# Patient Record
Sex: MALE | Race: WHITE | NOT HISPANIC OR LATINO
[De-identification: names, ages, dates, MRNs, and addresses within clinical notes are randomized per-mention and may not be internally consistent; named-entity substitution may affect disease eponyms.]

---

## 2017-04-09 ENCOUNTER — TRANSCRIPTION ENCOUNTER (OUTPATIENT)
Age: 22
End: 2017-04-09

## 2017-11-17 ENCOUNTER — NON-APPOINTMENT (OUTPATIENT)
Age: 22
End: 2017-11-17

## 2017-11-17 ENCOUNTER — APPOINTMENT (OUTPATIENT)
Dept: CARDIOLOGY | Facility: CLINIC | Age: 22
End: 2017-11-17
Payer: COMMERCIAL

## 2017-11-17 VITALS
DIASTOLIC BLOOD PRESSURE: 67 MMHG | WEIGHT: 207 LBS | HEIGHT: 68 IN | BODY MASS INDEX: 31.37 KG/M2 | OXYGEN SATURATION: 99 % | SYSTOLIC BLOOD PRESSURE: 110 MMHG | HEART RATE: 68 BPM

## 2017-11-17 DIAGNOSIS — Z82.49 FAMILY HISTORY OF ISCHEMIC HEART DISEASE AND OTHER DISEASES OF THE CIRCULATORY SYSTEM: ICD-10-CM

## 2017-11-17 DIAGNOSIS — F90.9 ATTENTION-DEFICIT HYPERACTIVITY DISORDER, UNSPECIFIED TYPE: ICD-10-CM

## 2017-11-17 PROCEDURE — 99244 OFF/OP CNSLTJ NEW/EST MOD 40: CPT

## 2017-11-17 PROCEDURE — 93000 ELECTROCARDIOGRAM COMPLETE: CPT

## 2018-01-22 ENCOUNTER — TRANSCRIPTION ENCOUNTER (OUTPATIENT)
Age: 23
End: 2018-01-22

## 2018-03-13 ENCOUNTER — EMERGENCY (EMERGENCY)
Facility: HOSPITAL | Age: 23
LOS: 1 days | Discharge: HOME | End: 2018-03-13
Attending: EMERGENCY MEDICINE | Admitting: EMERGENCY MEDICINE
Payer: COMMERCIAL

## 2018-03-13 VITALS
WEIGHT: 188.05 LBS | SYSTOLIC BLOOD PRESSURE: 143 MMHG | HEIGHT: 70 IN | DIASTOLIC BLOOD PRESSURE: 76 MMHG | HEART RATE: 86 BPM | RESPIRATION RATE: 18 BRPM | TEMPERATURE: 98 F | OXYGEN SATURATION: 97 %

## 2018-03-13 LAB
ALBUMIN SERPL ELPH-MCNC: 4.3 G/DL — SIGNIFICANT CHANGE UP (ref 3.3–5)
ALP SERPL-CCNC: 65 U/L — SIGNIFICANT CHANGE UP (ref 30–120)
ALT FLD-CCNC: 22 U/L DA — SIGNIFICANT CHANGE UP (ref 10–60)
ANION GAP SERPL CALC-SCNC: 14 MMOL/L — SIGNIFICANT CHANGE UP (ref 5–17)
APAP SERPL-MCNC: <1 — SIGNIFICANT CHANGE UP (ref 10–30)
APPEARANCE UR: CLEAR — SIGNIFICANT CHANGE UP
AST SERPL-CCNC: 19 U/L — SIGNIFICANT CHANGE UP (ref 10–40)
BASOPHILS # BLD AUTO: 0 K/UL — SIGNIFICANT CHANGE UP (ref 0–0.2)
BASOPHILS NFR BLD AUTO: 0.6 % — SIGNIFICANT CHANGE UP (ref 0–2)
BILIRUB SERPL-MCNC: 0.3 MG/DL — SIGNIFICANT CHANGE UP (ref 0.2–1.2)
BILIRUB UR-MCNC: NEGATIVE — SIGNIFICANT CHANGE UP
BUN SERPL-MCNC: 12 MG/DL — SIGNIFICANT CHANGE UP (ref 7–23)
CALCIUM SERPL-MCNC: 9.1 MG/DL — SIGNIFICANT CHANGE UP (ref 8.4–10.5)
CHLORIDE SERPL-SCNC: 105 MMOL/L — SIGNIFICANT CHANGE UP (ref 96–108)
CO2 SERPL-SCNC: 22 MMOL/L — SIGNIFICANT CHANGE UP (ref 22–31)
COLOR SPEC: YELLOW — SIGNIFICANT CHANGE UP
CREAT SERPL-MCNC: 0.8 MG/DL — SIGNIFICANT CHANGE UP (ref 0.5–1.3)
DIFF PNL FLD: NEGATIVE — SIGNIFICANT CHANGE UP
EOSINOPHIL # BLD AUTO: 0 K/UL — SIGNIFICANT CHANGE UP (ref 0–0.5)
EOSINOPHIL NFR BLD AUTO: 0.2 % — SIGNIFICANT CHANGE UP (ref 0–6)
ETHANOL SERPL-MCNC: 213 MG/DL — HIGH (ref 0–3)
GLUCOSE SERPL-MCNC: 105 MG/DL — HIGH (ref 70–99)
GLUCOSE UR QL: NEGATIVE MG/DL — SIGNIFICANT CHANGE UP
HCT VFR BLD CALC: 43.4 % — SIGNIFICANT CHANGE UP (ref 39–50)
HGB BLD-MCNC: 15 G/DL — SIGNIFICANT CHANGE UP (ref 13–17)
KETONES UR-MCNC: NEGATIVE — SIGNIFICANT CHANGE UP
LEUKOCYTE ESTERASE UR-ACNC: NEGATIVE — SIGNIFICANT CHANGE UP
LYMPHOCYTES # BLD AUTO: 3.4 K/UL — HIGH (ref 1–3.3)
LYMPHOCYTES # BLD AUTO: 46.5 % — HIGH (ref 13–44)
MCHC RBC-ENTMCNC: 30.6 PG — SIGNIFICANT CHANGE UP (ref 27–34)
MCHC RBC-ENTMCNC: 34.6 GM/DL — SIGNIFICANT CHANGE UP (ref 32–36)
MCV RBC AUTO: 88.4 FL — SIGNIFICANT CHANGE UP (ref 80–100)
MONOCYTES # BLD AUTO: 0.6 K/UL — SIGNIFICANT CHANGE UP (ref 0–0.9)
MONOCYTES NFR BLD AUTO: 7.7 % — SIGNIFICANT CHANGE UP (ref 2–14)
NEUTROPHILS # BLD AUTO: 3.3 K/UL — SIGNIFICANT CHANGE UP (ref 1.8–7.4)
NEUTROPHILS NFR BLD AUTO: 44.9 % — SIGNIFICANT CHANGE UP (ref 43–77)
NITRITE UR-MCNC: NEGATIVE — SIGNIFICANT CHANGE UP
PCP SPEC-MCNC: SIGNIFICANT CHANGE UP
PH UR: 6 — SIGNIFICANT CHANGE UP (ref 5–8)
PLATELET # BLD AUTO: 261 K/UL — SIGNIFICANT CHANGE UP (ref 150–400)
POTASSIUM SERPL-MCNC: 3.7 MMOL/L — SIGNIFICANT CHANGE UP (ref 3.5–5.3)
POTASSIUM SERPL-SCNC: 3.7 MMOL/L — SIGNIFICANT CHANGE UP (ref 3.5–5.3)
PROT SERPL-MCNC: 8 G/DL — SIGNIFICANT CHANGE UP (ref 6–8.3)
PROT UR-MCNC: NEGATIVE MG/DL — SIGNIFICANT CHANGE UP
RBC # BLD: 4.91 M/UL — SIGNIFICANT CHANGE UP (ref 4.2–5.8)
RBC # FLD: 12.1 % — SIGNIFICANT CHANGE UP (ref 10.3–14.5)
SALICYLATES SERPL-MCNC: 5 MG/DL — SIGNIFICANT CHANGE UP (ref 2.8–20)
SODIUM SERPL-SCNC: 141 MMOL/L — SIGNIFICANT CHANGE UP (ref 135–145)
SP GR SPEC: 1.01 — SIGNIFICANT CHANGE UP (ref 1.01–1.02)
UROBILINOGEN FLD QL: NEGATIVE MG/DL — SIGNIFICANT CHANGE UP
WBC # BLD: 7.4 K/UL — SIGNIFICANT CHANGE UP (ref 3.8–10.5)
WBC # FLD AUTO: 7.4 K/UL — SIGNIFICANT CHANGE UP (ref 3.8–10.5)

## 2018-03-13 PROCEDURE — 99285 EMERGENCY DEPT VISIT HI MDM: CPT

## 2018-03-13 RX ORDER — DIPHENHYDRAMINE HCL 50 MG
50 CAPSULE ORAL ONCE
Qty: 0 | Refills: 0 | Status: COMPLETED | OUTPATIENT
Start: 2018-03-13 | End: 2018-03-13

## 2018-03-13 RX ADMIN — Medication 50 MILLIGRAM(S): at 23:31

## 2018-03-13 RX ADMIN — Medication 2 MILLIGRAM(S): at 23:31
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
## 2018-03-13 NOTE — ED ADULT NURSE NOTE - OBJECTIVE STATEMENT
pt states he doesn't know why he is here, states he take Adderall and may be that is causing problems. pt BIB stepfather for acting out behavior at home. +smell of alcohol on breath. pt denies SI or HI.

## 2018-03-13 NOTE — ED PROVIDER NOTE - NS_ ATTENDINGSCRIBEDETAILS _ED_A_ED_FT
Anthony Jacome MD - The scribe's documentation has been prepared under my direction and personally reviewed by me in its entirety. I confirm that the note above accurately reflects all work, treatment, procedures, and medical decision making performed by me.

## 2018-03-13 NOTE — ED PROVIDER NOTE - PROGRESS NOTE DETAILS
Pt's step-father states pt admitted to drinking alcohol tonight. Patient became agitated, confrontational, making threatening physical contact with staff.  Sedation ordered

## 2018-03-14 VITALS
DIASTOLIC BLOOD PRESSURE: 86 MMHG | OXYGEN SATURATION: 97 % | RESPIRATION RATE: 18 BRPM | TEMPERATURE: 98 F | SYSTOLIC BLOOD PRESSURE: 123 MMHG | HEART RATE: 84 BPM

## 2018-03-14 DIAGNOSIS — F19.94 OTHER PSYCHOACTIVE SUBSTANCE USE, UNSPECIFIED WITH PSYCHOACTIVE SUBSTANCE-INDUCED MOOD DISORDER: ICD-10-CM

## 2018-03-14 DIAGNOSIS — F98.8 OTHER SPECIFIED BEHAVIORAL AND EMOTIONAL DISORDERS WITH ONSET USUALLY OCCURRING IN CHILDHOOD AND ADOLESCENCE: ICD-10-CM

## 2018-03-14 LAB
AMPHET UR-MCNC: NEGATIVE — SIGNIFICANT CHANGE UP
BARBITURATES UR SCN-MCNC: NEGATIVE — SIGNIFICANT CHANGE UP
BENZODIAZ UR-MCNC: NEGATIVE — SIGNIFICANT CHANGE UP
COCAINE METAB.OTHER UR-MCNC: POSITIVE
ETHANOL SERPL-MCNC: 97 MG/DL — HIGH (ref 0–3)
METHADONE UR-MCNC: NEGATIVE — SIGNIFICANT CHANGE UP
OPIATES UR-MCNC: NEGATIVE — SIGNIFICANT CHANGE UP
PCP UR-MCNC: NEGATIVE — SIGNIFICANT CHANGE UP
THC UR QL: POSITIVE

## 2018-03-14 PROCEDURE — 99285 EMERGENCY DEPT VISIT HI MDM: CPT | Mod: 25

## 2018-03-14 PROCEDURE — 85027 COMPLETE CBC AUTOMATED: CPT

## 2018-03-14 PROCEDURE — 90792 PSYCH DIAG EVAL W/MED SRVCS: CPT | Mod: GT

## 2018-03-14 PROCEDURE — 81003 URINALYSIS AUTO W/O SCOPE: CPT

## 2018-03-14 PROCEDURE — 96372 THER/PROPH/DIAG INJ SC/IM: CPT

## 2018-03-14 PROCEDURE — 80053 COMPREHEN METABOLIC PANEL: CPT

## 2018-03-14 PROCEDURE — 80307 DRUG TEST PRSMV CHEM ANLYZR: CPT
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
## 2018-03-14 NOTE — ED BEHAVIORAL HEALTH ASSESSMENT NOTE - LEGAL HISTORY
Denies - As per dad, there was a document in pt's belongings indicating he was recently arrested for possession of marijuana but unclear details.

## 2018-03-14 NOTE — ED BEHAVIORAL HEALTH ASSESSMENT NOTE - SUMMARY
Patient is a 22 year old male, domiciled, senior at [employer], non-caregiver, with a self-reported PPH of ADD and Depression, denies prior hospitalizations, current outpatient treatment with psychiatrist Dr. Floyd and therapist Kavin Harris, denies hx of self harm behaviors, denies prior suicide attempts, denies manic or psychotic s/s, denies hx of violence or arrests, denies trauma, + ETOH, Marijuana and Cocaine use/abuse, with no relevant past medical history, brought in by EMS, from home, presenting with agitation/aggression and possible SI, in the context of intoxication.    Pt evaluated after sufficient time lapsed for sobriety. Pt has a difficult time recalling the events of last night as he blacked out in the context of alcohol, marijuana, prescribed Adderall and possibly cocaine use. At time of interview, pt is remorseful for his behavior but shows limited insight regarding the impact substances are having in his life. Pt denies s/s of depression, denies suicidal ideations, intent or plans. Pt is not manic or psychotic. He denies any aggressive or homicidal ideations, intent or plans. Dad confirms that when sober, pt does not exhibit any concerning symptoms, is not usually agitated or aggressive. Dad feels safe taking pt home now that he is sober. Pt willing to accept resources for substance use at this time although does not feel he has a problem. Pt to follow up with his current outpatient providers. Family and patient advised to return to ED or call 911 for any worsening symptoms or safety concerns.

## 2018-03-14 NOTE — ED BEHAVIORAL HEALTH ASSESSMENT NOTE - OTHER
15 [address] Vee Father Reports issues with focus at school but has normal attention span throughout interview Conflicting sexual identity issues Likely conflicting sexual identity issues Substance use Superficially cooperative but minimizing drug use and guarded about some topics Likely impaired while on substances n/a

## 2018-03-14 NOTE — ED BEHAVIORAL HEALTH ASSESSMENT NOTE - SUICIDE PROTECTIVE FACTORS
Responsibility to family and others/Identifies reasons for living/Future oriented/Supportive social network or family/Positive therapeutic relationships/Engaged in work or school

## 2018-03-14 NOTE — ED ADULT NURSE REASSESSMENT NOTE - NS ED NURSE REASSESS COMMENT FT1
pt agitated, got OOB, wants to go home . [facility] called.
pt sleeping, no distress noted, 1:1 observation in progress.
Tele psyche completed and cleared , pt reevaluated by MICHAEL MORRISON. pt denies any complaints. ambulated with steady gait, speech clear. AAOx3. pt D/C home with f/u instructions.

## 2018-03-14 NOTE — ED BEHAVIORAL HEALTH ASSESSMENT NOTE - DESCRIPTION (FIRST USE, LAST USE, QUANTITY, FREQUENCY, DURATION)
Reports drinking on weekends primarily although drank tonight because it was a snow day. BAL upon arrival was 213 mg/dL Reports use couple times per week Minimizing use, reports last use was during a party 1-2 weeks ago where he put some in his mouth as a "freeze"
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
## 2018-03-14 NOTE — ED BEHAVIORAL HEALTH NOTE - BEHAVIORAL HEALTH NOTE
ISTOP Reference #: [id number]     02/26/2018 03/01/2018 dextroamp-amphet er 15 mg cap  60 30 Medhat Floyd MD     02/23/2018 02/24/2018 dextroamp-amphet er 15 mg cap  14 7 Bacilio Floyd MD     01/22/2018 01/28/2018 dextroamp-amphet er 15 mg cap  60 30 Medhat Floyd MD     12/12/2017 12/13/2017 dextroamp-amphet er 15 mg cap  60 30 Medhat Floyd MD     11/22/2017 11/22/2017 dextroamp-amphet er 15 mg cap  30 30 Medhat Floyd MD

## 2018-03-14 NOTE — ED BEHAVIORAL HEALTH ASSESSMENT NOTE - DETAILS
Father Pt made a suicidal statement while intoxicated See HPI Paternal aunt (same one as below) - Dementia; Paternal grandfather - Dementia Mother - marijuana; Paternal aunt - prescription drug abuse and alcohol use d/o

## 2018-03-14 NOTE — ED BEHAVIORAL HEALTH ASSESSMENT NOTE - RISK ASSESSMENT
Risk factors: Single, male gender, long hx of depression, impulsivity, voiced suicidality when intoxicated, substance abuse, limited insight into behaviors, likely conflicting sexual identity issues.     Protective factors: Young, healthy, denies any active suicidal ideation/intent/plan, no hx of prior attempts, no self-harm behaviors, no hospitalizations, no family hx, has no acute affective or psychotic disorder/symptoms, has responsibility to family and others, identifies reasons for living, future oriented, supportive social network or family, engaged in school, positive therapeutic relationships, medication/follow up compliance, no access to firearms, no hx of abuse and adequate outpatient psychiatric follow up with motivation to participate in care.     Based on risk assessment evaluation, Pt does not appear to be at imminent risk of harm to self or others at this time.

## 2018-03-14 NOTE — ED BEHAVIORAL HEALTH ASSESSMENT NOTE - CASE SUMMARY
Patient is a 22 year old male, domiciled, senior at [employer], non-caregiver, with a self-reported PPH of ADD and Depression, denies prior hospitalizations, current outpatient treatment with psychiatrist Dr. Floyd and therapist Kavin Harris, denies hx of self harm behaviors, denies prior suicide attempts, denies manic or psychotic s/s, denies hx of violence or arrests, denies trauma, + ETOH, Marijuana and Cocaine use/abuse, with no relevant past medical history, brought in by EMS, from home, presenting with agitation/aggression and possible SI, in the context of intoxication.  pT. DOES not meet criteria for inpt. psych.  Discharge home with oupt. f/u.  Substance abuse material given.

## 2018-03-14 NOTE — ED BEHAVIORAL HEALTH ASSESSMENT NOTE - DESCRIPTION
As per EM/[place] documentation, pt was agitated and aggressive upon arrival, demanding to leave, required IM medications. At time of  evaluation, pt calm and cooperative. denies Pt lives with his father, step-father (father's ) and his four siblings. He is attending [employer], is about to graduate with his degree in Business Management, is not able to answer regarding his sexual orientation at this time.

## 2018-04-30 ENCOUNTER — TRANSCRIPTION ENCOUNTER (OUTPATIENT)
Age: 23
End: 2018-04-30

## 2018-08-11 ENCOUNTER — TRANSCRIPTION ENCOUNTER (OUTPATIENT)
Age: 23
End: 2018-08-11

## 2019-01-03 ENCOUNTER — TRANSCRIPTION ENCOUNTER (OUTPATIENT)
Age: 24
End: 2019-01-03

## 2021-04-22 ENCOUNTER — APPOINTMENT (OUTPATIENT)
Dept: CARDIOLOGY | Facility: CLINIC | Age: 26
End: 2021-04-22

## 2021-04-27 ENCOUNTER — EMERGENCY (EMERGENCY)
Facility: HOSPITAL | Age: 26
LOS: 1 days | Discharge: HOME | End: 2021-04-27
Attending: EMERGENCY MEDICINE | Admitting: EMERGENCY MEDICINE
Payer: COMMERCIAL

## 2021-04-27 VITALS
DIASTOLIC BLOOD PRESSURE: 74 MMHG | RESPIRATION RATE: 20 BRPM | WEIGHT: 255.96 LBS | HEART RATE: 85 BPM | OXYGEN SATURATION: 98 % | SYSTOLIC BLOOD PRESSURE: 124 MMHG | TEMPERATURE: 98 F | HEIGHT: 70 IN

## 2021-04-27 VITALS
TEMPERATURE: 98 F | OXYGEN SATURATION: 97 % | SYSTOLIC BLOOD PRESSURE: 122 MMHG | DIASTOLIC BLOOD PRESSURE: 71 MMHG | HEART RATE: 82 BPM | RESPIRATION RATE: 18 BRPM

## 2021-04-27 PROCEDURE — 99284 EMERGENCY DEPT VISIT MOD MDM: CPT

## 2021-04-27 PROCEDURE — 99283 EMERGENCY DEPT VISIT LOW MDM: CPT

## 2021-04-27 RX ADMIN — Medication 40 MILLIGRAM(S): at 21:19

## 2021-04-27 NOTE — ED PROVIDER NOTE - CLINICAL SUMMARY MEDICAL DECISION MAKING FREE TEXT BOX
26yo male with paresthesias to upper and lower extremity, most likely radiculopathy, po steroids, neuro outpt follow up

## 2021-04-27 NOTE — ED PROVIDER NOTE - OBJECTIVE STATEMENT
26yo male who presents with pins and needles to the arms and legs for 2 weeks. pt states he was boxing and punched a bag and felt a pop in his upper back, and since then has been having pins and needles to both hands, now radiating to arms, and feet, radiating up to ankles, no weakness, no neck or back pain, no dizziness pt has been taking motrin with some relief, pt also has appt monday with a neurologist for further work up

## 2021-04-27 NOTE — ED ADULT NURSE NOTE - OBJECTIVE STATEMENT
Patient presents states he was boxing 2 weeks ago and he felt a snap in his upper back. since then he has noted tingling progressively to his hands, arms, shoulders and feet. Patient has a scheduled appointment with a neurologist in the next week. No weakness.

## 2021-04-27 NOTE — ED PROVIDER NOTE - NSFOLLOWUPINSTRUCTIONS_ED_ALL_ED_FT
Cervical Radiculopathy    WHAT YOU NEED TO KNOW:    Cervical radiculopathy is a painful condition that happens when a spinal nerve in your neck is pinched or irritated.     Vertebral Column         DISCHARGE INSTRUCTIONS:    Medicines: You may need any of the following:  •NSAIDs help decrease swelling and pain. This medicine can be bought without a doctor's order. This medicine can cause stomach bleeding or kidney problems in certain people. If you take blood thinner medicine, always ask your healthcare provider if NSAIDs are safe for you. Always read the medicine label and follow the directions on it before using this medicine.      •Prescription pain medicine helps decrease pain. Do not wait until the pain is severe before you take this medicine.      •Steroids help decrease pain and swelling. These may be given as a pill or as an injection in your neck. You may need more than 1 injection if your symptoms do not improve after the first treatment.       •Take your medicine as directed. Contact your healthcare provider if you think your medicine is not helping or if you have side effects. Tell him of her if you are allergic to any medicine. Keep a list of the medicines, vitamins, and herbs you take. Include the amounts, and when and why you take them. Bring the list or the pill bottles to follow-up visits. Carry your medicine list with you in case of an emergency.      Follow up with your healthcare provider or spine specialist as directed: Write down your questions so you remember to ask them during your visits.     Physical therapy: Your healthcare provider may suggest physical therapy to stretch and strengthen your muscles. Your physical therapist can teach you how to improve your posture and the way you hold your neck. He may also teach you how to be safely active and avoid further injury. He can also help you develop an exercise program that is safe for your back and neck.     Self-care:   •Ice helps decrease swelling and pain. Ice may also help prevent tissue damage. Use an ice pack, or put crushed ice in a plastic bag. Cover it with a towel and place it on your neck for 15 to 20 minutes every hour or as directed.      •Rest when you feel it is needed. Slowly start to do more each day. Return to your daily activities as directed.       •Wear a soft collar. You may be given a soft collar to support your neck while you sleep. Wear the soft collar only as directed.  Cervical Collars           •Do light stretches and regular exercise. Your healthcare provider may suggest light stretches to help decrease stiffness in your neck and arm as you recover. After your pain is controlled, you may benefit from regular exercise. Ask what type of exercise is safe for your back and neck.       •Review your work area. A comfortable work area can help prevent neck strain. Ask your employer for an ergonomic review to check the position of your desk, chair, phone, and computer. Make any necessary adjustments for your comfort.       Contact your healthcare provider or spine specialist if:   •You have a fever.      •You are losing weight without trying.      •Your pain is worse, even with medicine.      •One or both hands feel more numb than before, or you cannot move your fingers well.      •You have questions or concerns about your condition or care.

## 2021-04-27 NOTE — ED ADULT NURSE NOTE - CHPI ED NUR SYMPTOMS NEG
no anorexia/no bladder dysfunction/no bowel dysfunction/no constipation/no difficulty bearing weight/no fatigue/no motor function loss/no neck tenderness

## 2021-04-27 NOTE — ED PROVIDER NOTE - PATIENT PORTAL LINK FT
You can access the FollowMyHealth Patient Portal offered by [facility] by registering at the following website: [email or web address]. By joining [facility]’s FollowMyHealth portal, you will also be able to view your health information using other applications (apps) compatible with our system.

## 2021-04-27 NOTE — ED ADULT TRIAGE NOTE - CCCP TRG CHIEF CMPLNT
BILATERAL DIGITAL SCREENING MAMMOGRAM TOMOSYNTHESIS WITH CAD: 7/31/2018

CLINICAL HISTORY: Routine screening.  





TECHNIQUE: The study was acquired using full field digital technology and interpreted from soft copy.
 Breast tomosynthesis in addition to standard 2D mammography was performed. Current study was also ev
aluated with a Computer Aided Detection (CAD) system.  



COMPARISON: Comparison is made to exams dated:  7/26/2017 mammogram, 7/25/2016 mammogram, 12/1/2015 m
ammogram, 7/22/2015 mammogram, 7/18/2014 mammogram, and 7/10/2013 mammogram - [facility]
enter.   

BREAST COMPOSITION: There are scattered areas of fibroglandular density in both breasts.  



FINDINGS: 

The parenchymal pattern is unchanged. No developing mass, architectural distortion or cluster of susp
icious microcalcifications is seen in either breast.  





IMPRESSION: ACR BI-RADS CATEGORY 2: BENIGN

There is no mammographic evidence of malignancy. A 1 year screening mammogram is recommended.(08/01/2
019)  The patient will receive written notification of the results.  





Some breast cancers are not detected with mammography. A negative mammographic report should not ghada
y biopsy if a clinically suggestive mass is present.



Odessa Baeza M.D.          

ay/:7/31/2018 15:51:49  



Imaging Technologist: RT Seth([id number])(M), [facility]

letter sent: Normal 1/2  

BI-RADS Code: ACR BI-RADS Category 2: Benign numbness

## 2021-06-21 NOTE — ED ADULT NURSE NOTE - NS ED PATIENT SAFETY CONCERN
Airway patent, TM normal bilaterally, normal appearing mouth, nose, throat, neck supple with full range of motion, no cervical adenopathy. No

## 2021-06-30 PROBLEM — F41.9 ANXIETY DISORDER, UNSPECIFIED: Chronic | Status: ACTIVE | Noted: 2021-04-27

## 2021-07-06 DIAGNOSIS — F12.90 CANNABIS USE, UNSPECIFIED, UNCOMPLICATED: ICD-10-CM

## 2021-07-06 DIAGNOSIS — Z78.9 OTHER SPECIFIED HEALTH STATUS: ICD-10-CM

## 2021-07-06 RX ORDER — ESCITALOPRAM OXALATE 20 MG/1
20 TABLET, FILM COATED ORAL
Refills: 0 | Status: ACTIVE | COMMUNITY

## 2021-07-07 ENCOUNTER — APPOINTMENT (OUTPATIENT)
Dept: CARDIOLOGY | Facility: CLINIC | Age: 26
End: 2021-07-07
Payer: COMMERCIAL

## 2021-07-07 ENCOUNTER — NON-APPOINTMENT (OUTPATIENT)
Age: 26
End: 2021-07-07

## 2021-07-07 VITALS
OXYGEN SATURATION: 98 % | WEIGHT: 214 LBS | SYSTOLIC BLOOD PRESSURE: 112 MMHG | HEIGHT: 68 IN | HEART RATE: 70 BPM | BODY MASS INDEX: 32.43 KG/M2 | DIASTOLIC BLOOD PRESSURE: 72 MMHG

## 2021-07-07 DIAGNOSIS — Z71.89 OTHER SPECIFIED COUNSELING: ICD-10-CM

## 2021-07-07 PROCEDURE — 93000 ELECTROCARDIOGRAM COMPLETE: CPT

## 2021-07-07 PROCEDURE — 99203 OFFICE O/P NEW LOW 30 MIN: CPT

## 2021-07-07 PROCEDURE — 99072 ADDL SUPL MATRL&STAF TM PHE: CPT

## 2023-02-23 ENCOUNTER — EMERGENCY (EMERGENCY)
Facility: HOSPITAL | Age: 28
LOS: 1 days | Discharge: HOME | End: 2023-02-23
Attending: EMERGENCY MEDICINE | Admitting: EMERGENCY MEDICINE
Payer: COMMERCIAL

## 2023-02-23 VITALS
SYSTOLIC BLOOD PRESSURE: 124 MMHG | TEMPERATURE: 100 F | DIASTOLIC BLOOD PRESSURE: 82 MMHG | HEART RATE: 90 BPM | RESPIRATION RATE: 16 BRPM | OXYGEN SATURATION: 98 %

## 2023-02-23 VITALS
RESPIRATION RATE: 20 BRPM | WEIGHT: 238.1 LBS | HEIGHT: 70 IN | OXYGEN SATURATION: 97 % | SYSTOLIC BLOOD PRESSURE: 147 MMHG | HEART RATE: 113 BPM | DIASTOLIC BLOOD PRESSURE: 93 MMHG | TEMPERATURE: 102 F

## 2023-02-23 LAB
ALBUMIN SERPL ELPH-MCNC: 4.1 G/DL — SIGNIFICANT CHANGE UP (ref 3.3–5)
ALP SERPL-CCNC: 71 U/L — SIGNIFICANT CHANGE UP (ref 30–120)
ALT FLD-CCNC: 25 U/L DA — SIGNIFICANT CHANGE UP (ref 10–60)
ANION GAP SERPL CALC-SCNC: 14 MMOL/L — SIGNIFICANT CHANGE UP (ref 5–17)
AST SERPL-CCNC: 25 U/L — SIGNIFICANT CHANGE UP (ref 10–40)
BASOPHILS # BLD AUTO: 0.03 K/UL — SIGNIFICANT CHANGE UP (ref 0–0.2)
BASOPHILS NFR BLD AUTO: 0.2 % — SIGNIFICANT CHANGE UP (ref 0–2)
BILIRUB SERPL-MCNC: 0.3 MG/DL — SIGNIFICANT CHANGE UP (ref 0.2–1.2)
BUN SERPL-MCNC: 8 MG/DL — SIGNIFICANT CHANGE UP (ref 7–23)
CALCIUM SERPL-MCNC: 9.1 MG/DL — SIGNIFICANT CHANGE UP (ref 8.4–10.5)
CHLORIDE SERPL-SCNC: 104 MMOL/L — SIGNIFICANT CHANGE UP (ref 96–108)
CO2 SERPL-SCNC: 20 MMOL/L — LOW (ref 22–31)
CREAT SERPL-MCNC: 0.76 MG/DL — SIGNIFICANT CHANGE UP (ref 0.5–1.3)
EGFR: 126 ML/MIN/1.73M2 — SIGNIFICANT CHANGE UP
EOSINOPHIL # BLD AUTO: 0.01 K/UL — SIGNIFICANT CHANGE UP (ref 0–0.5)
EOSINOPHIL NFR BLD AUTO: 0.1 % — SIGNIFICANT CHANGE UP (ref 0–6)
ETHANOL SERPL-MCNC: 121 MG/DL — HIGH (ref 0–3)
GLUCOSE SERPL-MCNC: 117 MG/DL — HIGH (ref 70–99)
HCT VFR BLD CALC: 40.5 % — SIGNIFICANT CHANGE UP (ref 39–50)
HGB BLD-MCNC: 13.9 G/DL — SIGNIFICANT CHANGE UP (ref 13–17)
IMM GRANULOCYTES NFR BLD AUTO: 0.3 % — SIGNIFICANT CHANGE UP (ref 0–0.9)
LYMPHOCYTES # BLD AUTO: 1.8 K/UL — SIGNIFICANT CHANGE UP (ref 1–3.3)
LYMPHOCYTES # BLD AUTO: 12.6 % — LOW (ref 13–44)
MCHC RBC-ENTMCNC: 29.8 PG — SIGNIFICANT CHANGE UP (ref 27–34)
MCHC RBC-ENTMCNC: 34.3 GM/DL — SIGNIFICANT CHANGE UP (ref 32–36)
MCV RBC AUTO: 86.7 FL — SIGNIFICANT CHANGE UP (ref 80–100)
MONOCYTES # BLD AUTO: 1.15 K/UL — HIGH (ref 0–0.9)
MONOCYTES NFR BLD AUTO: 8 % — SIGNIFICANT CHANGE UP (ref 2–14)
NEUTROPHILS # BLD AUTO: 11.3 K/UL — HIGH (ref 1.8–7.4)
NEUTROPHILS NFR BLD AUTO: 78.8 % — HIGH (ref 43–77)
NRBC # BLD: 0 /100 WBCS — SIGNIFICANT CHANGE UP (ref 0–0)
PLATELET # BLD AUTO: 284 K/UL — SIGNIFICANT CHANGE UP (ref 150–400)
POTASSIUM SERPL-MCNC: 3.7 MMOL/L — SIGNIFICANT CHANGE UP (ref 3.5–5.3)
POTASSIUM SERPL-SCNC: 3.7 MMOL/L — SIGNIFICANT CHANGE UP (ref 3.5–5.3)
PROT SERPL-MCNC: 7.8 G/DL — SIGNIFICANT CHANGE UP (ref 6–8.3)
RBC # BLD: 4.67 M/UL — SIGNIFICANT CHANGE UP (ref 4.2–5.8)
RBC # FLD: 12.5 % — SIGNIFICANT CHANGE UP (ref 10.3–14.5)
SODIUM SERPL-SCNC: 138 MMOL/L — SIGNIFICANT CHANGE UP (ref 135–145)
WBC # BLD: 14.33 K/UL — HIGH (ref 3.8–10.5)
WBC # FLD AUTO: 14.33 K/UL — HIGH (ref 3.8–10.5)

## 2023-02-23 PROCEDURE — 70450 CT HEAD/BRAIN W/O DYE: CPT | Mod: 26,MA

## 2023-02-23 PROCEDURE — 80053 COMPREHEN METABOLIC PANEL: CPT

## 2023-02-23 PROCEDURE — 99285 EMERGENCY DEPT VISIT HI MDM: CPT | Mod: 25

## 2023-02-23 PROCEDURE — 71250 CT THORAX DX C-: CPT | Mod: MA

## 2023-02-23 PROCEDURE — 71250 CT THORAX DX C-: CPT | Mod: 26,MA

## 2023-02-23 PROCEDURE — 0225U NFCT DS DNA&RNA 21 SARSCOV2: CPT

## 2023-02-23 PROCEDURE — 72125 CT NECK SPINE W/O DYE: CPT | Mod: MA

## 2023-02-23 PROCEDURE — 70486 CT MAXILLOFACIAL W/O DYE: CPT | Mod: MA

## 2023-02-23 PROCEDURE — 36415 COLL VENOUS BLD VENIPUNCTURE: CPT

## 2023-02-23 PROCEDURE — 70450 CT HEAD/BRAIN W/O DYE: CPT | Mod: MA

## 2023-02-23 PROCEDURE — 85025 COMPLETE CBC W/AUTO DIFF WBC: CPT

## 2023-02-23 PROCEDURE — 99284 EMERGENCY DEPT VISIT MOD MDM: CPT

## 2023-02-23 PROCEDURE — 72125 CT NECK SPINE W/O DYE: CPT | Mod: 26,MA

## 2023-02-23 PROCEDURE — 80307 DRUG TEST PRSMV CHEM ANLYZR: CPT

## 2023-02-23 PROCEDURE — 70486 CT MAXILLOFACIAL W/O DYE: CPT | Mod: 26,MA

## 2023-02-23 RX ORDER — IBUPROFEN 200 MG
600 TABLET ORAL ONCE
Refills: 0 | Status: COMPLETED | OUTPATIENT
Start: 2023-02-23 | End: 2023-02-23

## 2023-02-23 RX ORDER — ACETAMINOPHEN 500 MG
975 TABLET ORAL ONCE
Refills: 0 | Status: COMPLETED | OUTPATIENT
Start: 2023-02-23 | End: 2023-02-23

## 2023-02-23 RX ADMIN — Medication 975 MILLIGRAM(S): at 22:37

## 2023-02-23 RX ADMIN — Medication 600 MILLIGRAM(S): at 23:59

## 2023-02-23 RX ADMIN — Medication 975 MILLIGRAM(S): at 22:07

## 2023-02-23 RX ADMIN — Medication 1 TABLET(S): at 22:07

## 2023-02-23 NOTE — ED PROVIDER NOTE - CARE PROVIDER_API CALL
Your Dentist,   Phone: (   )    -  Fax: (   )    -  Follow Up Time: 4-6 Days    Your Primary Care provider,   Phone: (   )    -  Fax: (   )    -  Follow Up Time: 4-6 Days

## 2023-02-23 NOTE — ED PROVIDER NOTE - NSFOLLOWUPINSTRUCTIONS_ED_ALL_ED_FT
Alcohol Intoxication      Alcohol intoxication occurs when a person no longer thinks clearly or functions well (becomes impaired) after drinking alcohol. Intoxication can occur with just one drink. The legal definition of alcohol intoxication depends on the amount of alcohol in the blood (blood alcohol concentration, BAC). BAC of 80–100 mg/dL or higher is commonly considered legally intoxicated. The level of impairment depends on:  •The amount of alcohol the person had.      •The person's age, gender, and weight.      •How often the person drinks.      •Whether the person has other medical conditions, such as diabetes, seizures, or a heart condition.      Alcohol intoxication can range from mild to severe. The condition can be dangerous, especially if the person:  •Also took certain drugs or prescription medicines.    •Drinks a large amount of alcohol in a short period of time (binge drinks).  •For women, binge drinking is having four or more drinks at one time.      •For men, binge drinking is having five or more drinks at one time.        If you or anyone around you appears intoxicated, speak up and act.      What are the causes?    This condition is caused by drinking alcohol.      What increases the risk?    The following factors may make you more likely to develop this condition:  •Peer pressure in young adults.      •Difficulty managing stress.      •History of drug or alcohol abuse.      •Combining alcohol with drugs.      •Family history of drug or alcohol abuse.      •Low body weight.      •Binge drinking.        What are the signs or symptoms?    Symptoms of alcohol intoxication can vary from person to person. Symptoms can be mild, moderate, or severe.    Symptoms of mild alcohol intoxication may include:  •Feeling relaxed or sleepy.      •Having mild difficulty with coordination, speech, memory, or attention.      Symptoms of moderate alcohol intoxication may include:  •Strong anger or extreme sadness.      •Moderate difficulty with coordination, speech, memory, or attention.      Symptoms of severe alcohol intoxication may include:  •Severe difficulty with coordination, speech, memory, or attention.      •Passing out.      •Vomiting.      •Confusion.      •Slow breathing.      •Coma.      Intoxication can change quickly from mild to severe. It can cause coma or death, especially in people who are not exposed to alcohol often.      How is this diagnosed?    Your health care provider will ask you how much alcohol you drank and what kind you had. Intoxication may also be diagnosed based on:  •Your symptoms and medical history.      •A physical exam.      •A blood test that measures BAC.      •A smell of alcohol on your breath.        How is this treated?    Treatment for alcohol intoxication may include:  •Being monitored in an emergency department, hospital, or treatment center until your BAC comes down and it is safe for you to go home.      •IV fluids to prevent or treat loss of fluid in the body (dehydration).      •Medicine to treat nausea or vomiting or to get rid of alcohol in the body.      •Counseling (brief intervention) about the dangers of using alcohol.      •Treatment for substance use disorder.      •Oxygen therapy or a breathing machine (ventilator).      Long-term (chronic) exposure to alcohol can have long-term effects on your brain, heart, and gastrointestinal system. These effects can be serious and may also require treatment.      Follow these instructions at home:  A sign showing that a person should not drive.     Eating and drinking       A 12-ounce bottle of beer, a 5-ounce glass of wine, and a 1.5-ounce shot of hard liquor.       Three cups showing dark yellow, yellow, and pale yellow urine.   • Do not drink alcohol if:  •Your health care provider tells you not to drink.      •You are pregnant, may be pregnant, or are planning to become pregnant.      •You are under the legal drinking age (21 years old in the [place]      •You are taking medicines that should not be taken with alcohol.      •You have a medical condition, and alcohol makes it worse.      •You need to drive or perform activities that require you to be alert.      •You have substance use disorder.      •Ask your health care provider if alcohol is safe for you. If your health care provider allows you to drink alcohol, limit how much you have. You may drink:  •0–1 drink a day for women.     • 0–2 drinks a day for men.   •Be aware of how much alcohol is in your drink. In the [place] one drink equals one 12 oz bottle of beer (355 mL), one 5 oz glass of wine (148 mL), or one 1½ oz shot of hard liquor (44 mL).          •Avoid drinking alcohol on an empty stomach.      •Stay hydrated. Drink enough fluid to keep your urine pale yellow. Avoid caffeine because it can dehydrate you.      •Avoid drinking more than one drink per hour.      •When having multiple drinks, drink water or a non-alcoholic beverage between alcoholic drinks.      General instructions     •Take over-the-counter and prescription medicines only as told by your health care provider.      • Do not drive after drinking any amount of alcohol. Plan for a designated  or another way to go home.      •Have someone responsible stay with you while you are intoxicated. You should not be left alone.      •Keep all follow-up visits as told by your health care provider. This is important.        Contact a health care provider if:    •You do not feel better after a few days.      •You have problems at work, at school, or at home due to drinking.        Get help right away if:  •You have any of the following:  •Moderate to severe trouble with coordination, speech, memory, or attention.      •Trouble staying awake.      •Severe confusion.      •A seizure.      •Light-headedness.      •Fainting.      •Vomiting bright red blood or material that looks like coffee grounds.      •Bloody stool (feces). The blood may make your stool bright red, black, or tarry. It may also smell bad.      •Shakiness when trying to stop drinking.      •Thoughts about hurting yourself or others.        If you ever feel like you may hurt yourself or others, or have thoughts about taking your own life, get help right away. You can go to your nearest emergency department or call:   • Your local emergency services (911 in the [place]       • A suicide crisis helpline, such as the National Suicide Prevention Lifeline at 1-189.419.8444 or 767 in the [place] This is open 24 hours a day.         Summary    •Alcohol intoxication occurs when a person no longer thinks clearly or functions well after drinking alcohol.      •If your health care provider says that alcohol is safe for you, limit alcohol intake to no more than 1 drink a day for women (no drinks if you are pregnant) and 2 drinks a day for men. One drink equals 12 oz of beer, 5 oz of wine, or 1½ oz of hard liquor.      •Contact your health care provider if drinking has caused you problems at work, school, or home.      •Get help right away if you have thoughts about hurting yourself or others.      This information is not intended to replace advice given to you by your health care provider. Make sure you discuss any questions you have with your health care provider.          Contusion      A contusion is a deep bruise. Contusions are the result of a blunt injury to tissues and muscle fibers under the skin. The injury causes bleeding under the skin. The skin overlying the contusion may turn blue, purple, or yellow. Minor injuries will give you a painless contusion, but more severe injuries cause contusions that may stay painful and swollen for a few weeks.      Follow these instructions at home:    Pay attention to any changes in your symptoms. Let your health care provider know about them. Take these actions to relieve your pain.      Managing pain, stiffness, and swelling   Bag of ice on a towel on the skin. •Use resting, icing, applying pressure (compression), and raising (elevating) the injured area. This is often called the RICE strategy.  •Rest the injured area. Return to your normal activities as told by your health care provider. Ask your health care provider what activities are safe for you.    •If directed, put ice on the injured area:  •Put ice in a plastic bag.      •Place a towel between your skin and the bag.      •Leave the ice on for 20 minutes, 2–3 times per day.        •If directed, apply light compression to the injured area using an elastic bandage. Make sure the bandage is not wrapped too tightly. Remove and reapply the bandage as directed by your health care provider.      •If possible, raise (elevate) the injured area above the level of your heart while you are sitting or lying down.        General instructions     •Take over-the-counter and prescription medicines only as told by your health care provider.      •Keep all follow-up visits as told by your health care provider. This is important.        Contact a health care provider if:    •Your symptoms do not improve after several days of treatment.      •Your symptoms get worse.      •You have difficulty moving the injured area.        Get help right away if:    •You have severe pain.      •You have numbness in a hand or foot.      •Your hand or foot turns pale or cold.        Summary    •A contusion is a deep bruise.      •Contusions are the result of a blunt injury to tissues and muscle fibers under the skin.      •It is treated with rest, ice, compression, and elevation. You may be given over-the-counter medicines for pain.      •Contact a health care provider if your symptoms do not improve, or get worse.       •Get help right away if you have severe pain, have numbness, or the area turns pale or cold.      This information is not intended to replace advice given to you by your health care provider. Make sure you discuss any questions you have with your health care provider.          Fever, Adult        A person using an oral thermometer.       A person holding a temporal thermometer to another person's forehead.     A fever is an increase in the body's temperature. It is usually defined as a temperature of 100.4°F (38°C) or higher. Brief mild or moderate fevers generally have no long-term effects, and they often do not need treatment. Moderate or high fevers may make you feel uncomfortable and can sometimes be a sign of a serious illness or disease. The sweating that may occur with repeated or prolonged fever may also cause a loss of fluid in the body (dehydration).    Fever is confirmed by taking a temperature with a thermometer. A measured temperature can vary with:  •Age.      •Time of day.    •Where in the body you take the temperature. Readings may vary if you place the thermometer:  •In the mouth (oral).      •In the rectum (rectal).      •In the ear (tympanic).      •Under the arm (axillary).      •On the forehead (temporal).          Follow these instructions at home:    Medicines     •Take over-the counter and prescription medicines only as told by your health care provider. Follow the dosing instructions carefully.      •If you were prescribed an antibiotic medicine, take it as told by your health care provider. Do not stop taking the antibiotic even if you start to feel better.      General instructions     •Watch your condition for any changes. Let your health care provider know about them.      •Rest as needed.      •Drink enough fluid to keep your urine pale yellow. This helps to prevent dehydration.      •Sponge yourself or bathe with room-temperature water to help reduce your body temperature as needed. Do not use ice water.      • Do not use too many blankets or wear clothes that are too heavy.      •If your fever may be caused by an infection that spreads from person to person (is contagious), such as a cold or the flu, you should stay home from work and public gatherings for at least 24 hours after your fever is gone. Your fever should be gone without the need to use medicines.        Contact a health care provider if:    •You vomit.      •You cannot eat or drink without vomiting.      •You have diarrhea.      •You have pain when you urinate.      •Your symptoms do not improve with treatment.      •You develop new symptoms.      •You develop excessive weakness.        Get help right away if:    •You have shortness of breath or have trouble breathing.      •You are dizzy or you faint.      •You are disoriented or confused.    •You develop signs of dehydration, such as:  •Dark urine, very little urine, or no urine.      •Cracked lips.      •Dry mouth.      •Sunken eyes.      •Sleepiness.      •Weakness.        •You develop severe pain in your abdomen.      •You have persistent vomiting or diarrhea.      •You develop a skin rash.      •Your symptoms suddenly get worse.        Summary    •A fever is an increase in the body's temperature. It is usually defined as a temperature of 100.4°F (38°C) or higher. Moderate or high fevers can sometimes be a sign of a serious illness or disease. The sweating that may occur with repeated or prolonged fever may also cause dehydration.      •Pay attention to any changes in your symptoms and contact your health care provider if your symptoms do not improve with treatment.      •Take over-the counter and prescription medicines only as told by your health care provider. Follow the dosing instructions carefully.      •If your fever is from an infection that may be contagious, such as cold or flu, you should stay home from work and public gatherings for at least 24 hours after your fever is gone. Your fever should be gone without the need to use medicines.      •Get help right away if you develop signs of dehydration, such as dark urine, cracked lips, dry mouth, sunken eyes, sleepiness, or weakness.      This information is not intended to replace advice given to you by your health care provider. Make sure you discuss any questions you have with your health care provider.          Abrasion      An abrasion is a cut or a scrape on the surface of the skin. An abrasion does not go through all the layers of the skin. It is important to care for an abrasion properly to prevent infection.      What are the causes?    This condition is caused by rubbing your skin on something or falling on a surface, such as the ground. When your skin rubs on something, some layers of skin may rub off.      What are the signs or symptoms?    The main symptom of this condition is a cut or a scrape. The cut or scrape may be bleeding, or it may appear red or pink. If your abrasion was caused by a fall, there may be a bruise under your cut or scrape.      How is this diagnosed?    An abrasion is diagnosed with a physical exam.      How is this treated?    Treatment for this condition depends on how large and deep the abrasion is. In most cases:  •Your abrasion will be cleaned with water and mild soap. This is done to remove any dirt or debris (such as tiny bits of glass or rock) that may be stuck in your wound.      •An antibiotic ointment may be applied to your abrasion to help prevent infection.      •A bandage (dressing) may be placed on your abrasion to keep it clean.      You may also need a tetanus shot.      Follow these instructions at home:    Medicines     •Take or apply over-the-counter and prescription medicines only as told by your health care provider.      •If you were prescribed an antibiotic medicine, use it as told by your health care provider. Do not stop using the antibiotic even if you start to feel better.      Wound care   •Clean your wound 1 or 2 times a day, or as told by your health care provider. To do this:  1.Wash your hands for at least 20 seconds with mild soap and water. Do this before and after you clean your wound.      2.Wash your wound with mild soap and water and then rinse off the soap.      3.Pat your wound dry with a clean towel. Do not rub your wound.        •Keep your dressing clean and dry as told by your health care provider.      •There are many different ways to close and cover a wound. Follow instructions from your health care provider about caring for your wound and about changing and removing your dressing. You may have to change your dressing one or more times a day, or as directed by your health care provider.    •Check your wound every day for signs of infection. Check for:  •Redness, especially a red streak that spreads out from your wound.      •Swelling or increased pain.      •Warmth.      •Blood, fluid, pus, or a bad smell.          Managing pain and swelling    •If directed, put ice on the injured area. To do this:  •Put ice in a plastic bag.       •Place a towel between your skin and the bag.       • Leave the ice on for 20 minutes, 2–3 times a day.      •Remove the ice if your skin turns bright red. This is very important. If you cannot feel pain, heat, or cold, you have a greater risk of damage to the area.        •If possible, raise (elevate) the injured area above the level of your heart while you are sitting or lying down.      General instructions     • Do not take baths, swim, or use a hot tub until your health care provider approves. Ask your doctor about taking showers or sponge baths.      •Keep all follow-up visits. This is important.        Contact a health care provider if:    •You received a tetanus shot, and you have swelling, severe pain, redness, or bleeding at your injection site.      •Your pain is not controlled with medicine.      •You have a fever.    •You have any of these signs of infection:  •Redness, swelling, or more pain around your wound.      •Warmth coming from your wound.      •Blood, fluid, pus, or a bad smell coming from your wound.          Get help right away if:    •You have a red streak spreading away from your wound.        Summary    •An abrasion is a cut or a scrape on the surface of the skin. Care for your abrasion properly to prevent infection.      •Clean your wound with mild soap and water 1 or 2 times a day or as often as told. Follow instructions from your health care provider about taking medicines and changing your bandage (dressing).      •Contact your health care provider if you have a fever or if you have redness, swelling, or more pain around your wound.      •Contact your health care provider if you have warmth, blood, fluid, pus, or a bad smell coming from your wound.      •Get help right away if there is a red streak spreading away from your wound.      This information is not intended to replace advice given to you by your health care provider. Make sure you discuss any questions you have with your health care provider.

## 2023-02-23 NOTE — ED PROVIDER NOTE - CARE PLAN
1 Principal Discharge DX:	Facial contusion, initial encounter  Secondary Diagnosis:	Fever  Secondary Diagnosis:	Alcohol intoxication  Secondary Diagnosis:	Chest wall contusion  Secondary Diagnosis:	Laceration of finger, right

## 2023-02-23 NOTE — ED ADULT NURSE NOTE - NSSEPSISCRITERIAMET_ED_A_ED
54 yo male with no known pmh present after two episodes of dizziness. 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 he states this lasted for about 2 minutes and resolved in its own but then represented shorty after. he denies having this happen in the past. positive family history of MI in mother at 54 y/o that needed a CABG. denies any other symptoms including fever, chills, n/v/d, recent illness/travel, abdominal pain, chest pain, or SOB. Abnormal VS & WBC

## 2023-02-23 NOTE — ED ADULT NURSE NOTE - ALCOHOL PRE SCREEN (AUDIT - C)
SSKI Counseling:  I discussed with the patient the risks of SSKI including but not limited to thyroid abnormalities, metallic taste, GI upset, fever, headache, acne, arthralgias, paraesthesias, lymphadenopathy, easy bleeding, arrhythmias, and allergic reaction. Statement Selected
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
## 2023-02-23 NOTE — ED PROVIDER NOTE - PROVIDER TOKENS
FREE:[LAST:[Your Dentist],PHONE:[(   )    -],FAX:[(   )    -],FOLLOWUP:[4-6 Days]],FREE:[LAST:[Your Primary Care provider],PHONE:[(   )    -],FAX:[(   )    -],FOLLOWUP:[4-6 Days]]

## 2023-02-23 NOTE — ED ADULT NURSE NOTE - ED CARDIAC RHYTHM
regular Renal bx noted; conveying IgA nephropathy. IV steroids started. Will monitor over the next few days prior to proceeding with d/c planning for home on po steroids and close outpatient follow up with nephrology.
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
## 2023-02-23 NOTE — ED PROVIDER NOTE - PATIENT PORTAL LINK FT
You can access the FollowMyHealth Patient Portal offered by [facility] by registering at the following website: [email or web address]. By joining [facility]’s FollowMyHealth portal, you will also be able to view your health information using other applications (apps) compatible with our system.

## 2023-02-23 NOTE — ED ADULT NURSE NOTE - PAIN: BODY LOCATION
[facility] Division of Hospital Medicine  Ariana Piña MD  Pager (M-F, 1E-5P): [phone]  Other Times:  [phone]    Patient is a 59y old  Female who presents with a chief complaint of Palpitations (31 Oct 2020 11:11)      SUBJECTIVE / OVERNIGHT EVENTS: No acute events. No further episodes of RVR. No tele events.    ADDITIONAL REVIEW OF SYSTEMS: Negative    MEDICATIONS  (STANDING):  enoxaparin Injectable 40 milliGRAM(s) SubCutaneous daily  gabapentin 300 milliGRAM(s) Oral three times a day  influenza   Vaccine 0.5 milliLiter(s) IntraMuscular once  metoprolol tartrate 12.5 milliGRAM(s) Oral every 8 hours  pantoprazole    Tablet 40 milliGRAM(s) Oral before breakfast      PHYSICAL EXAM:  Vital Signs Last 24 Hrs  T(C): 36.6 (31 Oct 2020 13:01), Max: 36.7 (30 Oct 2020 18:00)  T(F): 97.8 (31 Oct 2020 13:01), Max: 98.1 (30 Oct 2020 18:00)  HR: 69 (31 Oct 2020 13:01) (50 - 69)  BP: 138/89 (31 Oct 2020 13:01) (108/78 - 143/83)  BP(mean): 83 (31 Oct 2020 04:34) (83 - 83)  RR: 18 (31 Oct 2020 13:01) (18 - 18)  SpO2: 99% (31 Oct 2020 13:01) (95% - 100%)    CONSTITUTIONAL: NAD, well-developed, well-groomed  EYES: PERRLA; conjunctiva and sclera clear  ENMT: Moist oral mucosa, no pharyngeal injection or exudates; normal dentition  NECK: Supple, no palpable masses; no thyromegaly  RESPIRATORY: Normal respiratory effort; lungs are clear to auscultation bilaterally  CARDIOVASCULAR: Regular rate and rhythm, normal S1 and S2, no murmur/rub/gallop; No lower extremity edema; Peripheral pulses are 2+ bilaterally  ABDOMEN: Nontender to palpation, normoactive bowel sounds, no rebound/guarding; No hepatosplenomegaly  MUSCULOSKELETAL:  Normal gait; no clubbing or cyanosis of digits; no joint swelling or tenderness to palpation  PSYCH: A+O to person, place, and time; affect appropriate  NEUROLOGY: CN 2-12 are intact and symmetric; no gross sensory deficits   SKIN: No rashes; no palpable lesions    LABS:                        12.8   6.68  )-----------( 262      ( 31 Oct 2020 06:41 )             39.6     10-    142  |  108  |  14  ----------------------------<  88  4.1   |  24  |  0.78    Ca    9.3      31 Oct 2020 06:41  Phos  3.0     10-30  Mg     2.3     10-30    TPro  8.7<H>  /  Alb  4.7  /  TBili  0.3  /  DBili  x   /  AST  49<H>  /  ALT  <5<L>  /  AlkPhos  77  10-30    PT/INR - ( 30 Oct 2020 05:10 )   PT: 10.5 sec;   INR: 0.87 ratio         PTT - ( 30 Oct 2020 05:10 )  PTT:27.3 sec      Urinalysis Basic - ( 30 Oct 2020 05:10 )    Color: Colorless / Appearance: Clear / S.005 / pH: x  Gluc: x / Ketone: Negative  / Bili: Negative / Urobili: Negative   Blood: x / Protein: Negative / Nitrite: Negative   Leuk Esterase: Negative / RBC: x / WBC x   Sq Epi: x / Non Sq Epi: x / Bacteria: x      
generalized body and left jaw
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
## 2023-02-24 LAB
RAPID RVP RESULT: SIGNIFICANT CHANGE UP
SARS-COV-2 RNA SPEC QL NAA+PROBE: SIGNIFICANT CHANGE UP

## 2024-04-24 ENCOUNTER — EMERGENCY (EMERGENCY)
Facility: HOSPITAL | Age: 29
LOS: 1 days | Discharge: HOME | End: 2024-04-24
Attending: EMERGENCY MEDICINE | Admitting: EMERGENCY MEDICINE
Payer: COMMERCIAL

## 2024-04-24 VITALS
HEART RATE: 88 BPM | RESPIRATION RATE: 18 BRPM | DIASTOLIC BLOOD PRESSURE: 64 MMHG | OXYGEN SATURATION: 98 % | TEMPERATURE: 99 F | SYSTOLIC BLOOD PRESSURE: 126 MMHG

## 2024-04-24 VITALS
WEIGHT: 270.07 LBS | HEART RATE: 92 BPM | SYSTOLIC BLOOD PRESSURE: 121 MMHG | OXYGEN SATURATION: 98 % | HEIGHT: 69 IN | DIASTOLIC BLOOD PRESSURE: 83 MMHG | RESPIRATION RATE: 18 BRPM | TEMPERATURE: 100 F

## 2024-04-24 PROCEDURE — 90377 RABIES IG HT&SOL HUMAN IM/SC: CPT

## 2024-04-24 PROCEDURE — 90675 RABIES VACCINE IM: CPT

## 2024-04-24 PROCEDURE — 99284 EMERGENCY DEPT VISIT MOD MDM: CPT

## 2024-04-24 PROCEDURE — 96372 THER/PROPH/DIAG INJ SC/IM: CPT

## 2024-04-24 PROCEDURE — 99283 EMERGENCY DEPT VISIT LOW MDM: CPT | Mod: 25

## 2024-04-24 PROCEDURE — 90471 IMMUNIZATION ADMIN: CPT

## 2024-04-24 RX ORDER — RABIES VACC, HUMAN DIPLOID/PF 2.5 UNIT
1 VIAL (EA) INTRAMUSCULAR ONCE
Refills: 0 | Status: COMPLETED | OUTPATIENT
Start: 2024-04-24 | End: 2024-04-24

## 2024-04-24 RX ORDER — HUMAN RABIES VIRUS IMMUNE GLOBULIN 150 [IU]/ML
2500 INJECTION, SOLUTION INTRAMUSCULAR ONCE
Refills: 0 | Status: COMPLETED | OUTPATIENT
Start: 2024-04-24 | End: 2024-04-24

## 2024-04-24 RX ADMIN — HUMAN RABIES VIRUS IMMUNE GLOBULIN 2500 UNIT(S): 150 INJECTION, SOLUTION INTRAMUSCULAR at 17:56

## 2024-04-24 RX ADMIN — Medication 1 MILLILITER(S): at 17:52

## 2024-04-24 NOTE — ED PROVIDER NOTE - OBJECTIVE STATEMENT
Patient states that around 1230 today he was in a parking lot and saw a dog run by his car.  Says it was a small Citizen of [place].  Patient got out of the car to try to move the dog into the road and the dog bit his left index finger.  Patient went to urgent care and got an antibiotic shot as well as some local wound care and was told to come to the emergency department for further evaluation.  Patient states the dog had no collar or leash but soon another gentleman came by and said it was his dog and took and then went away.  Patient has no other information about the dog.

## 2024-04-24 NOTE — ED ADULT NURSE NOTE - OBJECTIVE STATEMENT
Pt came in ambulatory for dog bite left 2nd digit . Pt was seen at an urgent care - was given a dose of ceftriaxone IM and prescribed with oral antibiotic.

## 2024-04-24 NOTE — ED ADULT NURSE NOTE - CHIEF COMPLAINT QUOTE
" I was bitten by a dog this afternoon , I don't know the owner of the dog " (+) dog bite left 2nd finger  Pt seen at [facility] -  prescribed with Augmentin and was given a dose of ceftriaxone IM. Pt sent in for rabies shot

## 2024-04-24 NOTE — ED PROVIDER NOTE - PATIENT PORTAL LINK FT
You can access the FollowMyHealth Patient Portal offered by [facility] by registering at the following website: [email or web address]. By joining [employer]’s FollowMyHealth portal, you will also be able to view your health information using other applications (apps) compatible with our system.

## 2024-04-24 NOTE — ED PROVIDER NOTE - NSFOLLOWUPINSTRUCTIONS_ED_ALL_ED_FT
-- You should update your primary care physician on your Emergency Department visit and follow up with them.  If you do not have a physician or have difficulty following up, please call: [phone] to obtain a [facility] doctor or specialist who can provide follow up.    -- Return to the ER on 4/27, 5/1, 5/8 for your series of rabies shots.    -- Return to the ER for worsening or persistent symptoms, and/or ANY NEW OR CONCERNING SYMPTOMS.

## 2024-04-24 NOTE — ED ADULT NURSE NOTE - NSFALLUNIVINTERV_ED_ALL_ED
Bed/Stretcher in lowest position, wheels locked, appropriate side rails in place/Call bell, personal items and telephone in reach/Instruct patient to call for assistance before getting out of bed/chair/stretcher/Non-slip footwear applied when patient is off stretcher/[place] to call system/Physically safe environment - no spills, clutter or unnecessary equipment/Purposeful proactive rounding/Room/bathroom lighting operational, light cord in reach

## 2024-04-24 NOTE — ED ADULT TRIAGE NOTE - CHIEF COMPLAINT QUOTE
" I was bitten by a dog this afternoon , I don't know the owner of the dog " (+) dog bite left 2nd finger  Pt seen at [facility] -  prescribed with Augmentin and was given a dose of ceftriaxone IM. Pt sent in for rabies shot

## 2024-04-25 NOTE — ED ADULT NURSE NOTE - NSIMPLEMENTINTERV_GEN_ALL_ED
Patient will return later today for urine.   Implemented All Universal Safety Interventions:  [place] to call system. Call bell, personal items and telephone within reach. Instruct patient to call for assistance. Room bathroom lighting operational. Non-slip footwear when patient is off stretcher. Physically safe environment: no spills, clutter or unnecessary equipment. Stretcher in lowest position, wheels locked, appropriate side rails in place.

## 2024-04-27 ENCOUNTER — EMERGENCY (EMERGENCY)
Facility: HOSPITAL | Age: 29
LOS: 1 days | Discharge: HOME | End: 2024-04-27
Attending: EMERGENCY MEDICINE | Admitting: EMERGENCY MEDICINE
Payer: COMMERCIAL

## 2024-04-27 VITALS
HEART RATE: 76 BPM | SYSTOLIC BLOOD PRESSURE: 104 MMHG | TEMPERATURE: 98 F | WEIGHT: 270.07 LBS | OXYGEN SATURATION: 96 % | DIASTOLIC BLOOD PRESSURE: 72 MMHG | HEIGHT: 69 IN | RESPIRATION RATE: 76 BRPM

## 2024-04-27 VITALS
OXYGEN SATURATION: 98 % | HEART RATE: 77 BPM | DIASTOLIC BLOOD PRESSURE: 72 MMHG | RESPIRATION RATE: 16 BRPM | SYSTOLIC BLOOD PRESSURE: 108 MMHG

## 2024-04-27 PROCEDURE — 90675 RABIES VACCINE IM: CPT

## 2024-04-27 PROCEDURE — 90471 IMMUNIZATION ADMIN: CPT | Mod: XU

## 2024-04-27 PROCEDURE — 99281 EMR DPT VST MAYX REQ PHY/QHP: CPT | Mod: 25

## 2024-04-27 PROCEDURE — 99283 EMERGENCY DEPT VISIT LOW MDM: CPT

## 2024-04-27 RX ORDER — RABIES VACC, HUMAN DIPLOID/PF 2.5 UNIT
1 VIAL (EA) INTRAMUSCULAR ONCE
Refills: 0 | Status: COMPLETED | OUTPATIENT
Start: 2024-04-27 | End: 2024-04-27

## 2024-04-27 RX ADMIN — Medication 1 MILLILITER(S): at 10:13

## 2024-04-27 NOTE — ED PROVIDER NOTE - OBJECTIVE STATEMENT
Patient presenting to ER for second rabies vaccine.  Patient had a dog bite from an unknown dog on April 24 went to urgent care was given antibiotics and sent to ER for rabies prophylaxis.  Patient received rabies immunoglobulin and vaccine in the ER and is returning today for second rabies vaccine.  Patient relates tetanus up-to-date.  Patient relates no adverse reaction to prior vaccine.  Patient denies fevers chills wound erythema or any other complaints  MITCHEL Nicholson

## 2024-04-27 NOTE — ED ADULT NURSE NOTE - NS_ED_NURSE_TEACHING_TOPIC_ED_A_ED
rabies vaccine. pt explained how to care for wound and advised on signs of infection which include redness, swelling,fever, pus discharge and /or pain and if any symptoms occur to return to emergency room for evaluation and pt verbalized understanding/Other specify

## 2024-04-27 NOTE — ED PROVIDER NOTE - CARE PROVIDER_API CALL
Román Nicholson  Internal Medicine  [address], [address]  [place] [address]  Phone: [phone]  Fax: [phone]  Follow Up Time: 4-6 Days

## 2024-04-27 NOTE — ED PROVIDER NOTE - PATIENT PORTAL LINK FT
You can access the FollowMyHealth Patient Portal offered by [facility] by registering at the following website: [email or web address]. By joining [facility]’s FollowMyHealth portal, you will also be able to view your health information using other applications (apps) compatible with our system.

## 2024-04-27 NOTE — ED PROVIDER NOTE - SKIN, MLM
Skin normal color for race, warm, dry. left 2nd finger bite sites with small bruising, no erythema or d/c, full rom, distal n/v intact, cap refill < 2 secs all fingers

## 2024-04-27 NOTE — ED PROVIDER NOTE - CLINICAL SUMMARY MEDICAL DECISION MAKING FREE TEXT BOX
Patient presenting to ER for second rabies vaccine.  Patient had a dog bite from an unknown dog on April 24 went to urgent care was given antibiotics and sent to ER for rabies prophylaxis.  Patient received rabies immunoglobulin and vaccine in the ER and is returning today for second rabies vaccine.  Patient relates tetanus up-to-date.  Patient relates no adverse reaction to prior vaccine.  Patient denies fevers chills wound erythema or any other complaints  PMD Lyn    Plan continue rabies vaccine series

## 2024-04-27 NOTE — ED ADULT NURSE NOTE - OBJECTIVE STATEMENT
stray dog bit right 2 nd digit. wound looks better per pt mild swelling and bruising to right 2nd digit medially. pt offers no other c/o

## 2024-04-27 NOTE — ED PROVIDER NOTE - NSFOLLOWUPINSTRUCTIONS_ED_ALL_ED_FT
Return in 4 days and 11 days for repeat rabies vaccines    RABIES VACCINE - Ambulatory Care    Rabies Vaccine    AMBULATORY CARE:    The rabies vaccine can prevent rabies. Rabies is a serious illness caused by a virus. The rabies virus is spread to humans through the bite or scratch of an infected animal. Dogs, bats, skunks, coyotes, raccoons, and foxes are examples of animals that can carry rabies. The rabies vaccine can protect you from infection if you are at high risk of exposure. The vaccine can also prevent infection after you are bitten by an infected animal.    Call your local emergency number (911 in the ) or have someone call if:    Your mouth and throat are swollen.    You are wheezing or have trouble breathing.    You have chest pain or your heart is beating faster than normal for you.    You feel like you are going to faint.  Seek care immediately if:    Your face is red or swollen.    You have hives that spread over your body.    You feel weak or dizzy.  Call your doctor if:    You have increased pain, redness, or swelling around the injection area.    You have a headache, muscle aches, or abdominal pain.    You have flu-like symptoms.    You have questions or concerns about the rabies vaccine.  When the vaccine is given: The rabies vaccine is not part of the usual vaccination schedule. Your healthcare provider will give you an injection schedule. You should receive a vaccine if you have a higher risk of exposure to rabies. This might include people who work with animals who may be infected with rabies. You should also receive the vaccine after being bitten or scratched by an infected animal. The following is a common dosing schedule:    Before possible exposure to the virus, the vaccine is given in 2 doses. The first dose can be given at any time. The second dose is given 7 days after the first. You may need a booster dose within 3 years of the first 2 doses.    After exposure to the virus, the vaccine is usually given in 2 or 4 doses:  If you have received the rabies vaccine in the past, you usually only need 2 doses. The first is given immediately and the second is given 3 days later.    If you have not received the rabies vaccine, you need 4 doses over 2 weeks. The first dose is given as soon as possible after exposure to rabies. The following doses are given on days 3, 7, and 14. A shot called rabies immune globulin is given at the same time as the first dose. This medicine helps your immune system fight the infection.  If you miss a dose or will miss a scheduled dose: Call your healthcare provider right away. He or she will tell you what to do. The best way to be protected is to stay on the injection schedule given to you. This is especially important if you are getting the vaccine after exposure to the rabies virus. Reschedule any makeup dose or upcoming dose for as close to the original appointment as possible. Remember that you cannot get more than 1 dose on any day.    Reasons not to get the rabies vaccine, or to wait to get it: Your healthcare provider may have you wait if you have not been exposed to rabies but are at high risk. If you have been exposed, you need to get the vaccine as soon as possible. Tell the provider if:    You had an allergic reaction to the rabies vaccine in the past, or to another vaccine.    You have any allergies.    You have a weakened immune system.    You take chloroquine or a similar medicine.    You are sick or have a fever of 101°F (38.3°C) or higher.  Risks of the rabies vaccine: The injection area may become red, tender, or swollen. You may develop a headache or muscle aches. You may have nausea or pain in your abdomen. You may have an allergic reaction to the vaccine. This can be life-threatening.    Apply a warm compress to the injection area as directed to decrease pain and swelling.    Follow up with your doctor as directed: Your doctor will need to check your blood regularly to make sure the vaccine is working. Write down your questions so you remember to ask them during your visits.

## 2024-04-27 NOTE — ED ADULT NURSE NOTE - NS ED NURSE DC INFO COMPLEXITY
Discharge instructions reviewed with patient. Questions answered and she stated understanding. Patient was given incentive spirometer, instructed on and encouraged to use at home. Sent with large bottle of CHG soap and instructed to use daily. Simple: Patient demonstrates quick and easy understanding/Patient asked questions/Verbalized Understanding

## 2024-05-01 ENCOUNTER — EMERGENCY (EMERGENCY)
Facility: HOSPITAL | Age: 29
LOS: 1 days | Discharge: HOME | End: 2024-05-01
Admitting: EMERGENCY MEDICINE
Payer: COMMERCIAL

## 2024-05-01 VITALS
WEIGHT: 270.07 LBS | HEIGHT: 69 IN | SYSTOLIC BLOOD PRESSURE: 117 MMHG | TEMPERATURE: 98 F | RESPIRATION RATE: 14 BRPM | OXYGEN SATURATION: 98 % | DIASTOLIC BLOOD PRESSURE: 87 MMHG | HEART RATE: 84 BPM

## 2024-05-01 VITALS
HEART RATE: 85 BPM | SYSTOLIC BLOOD PRESSURE: 116 MMHG | TEMPERATURE: 98 F | DIASTOLIC BLOOD PRESSURE: 86 MMHG | RESPIRATION RATE: 14 BRPM | OXYGEN SATURATION: 98 %

## 2024-05-01 PROCEDURE — 99281 EMR DPT VST MAYX REQ PHY/QHP: CPT | Mod: 25

## 2024-05-01 PROCEDURE — 99283 EMERGENCY DEPT VISIT LOW MDM: CPT

## 2024-05-01 PROCEDURE — 90675 RABIES VACCINE IM: CPT

## 2024-05-01 PROCEDURE — 90471 IMMUNIZATION ADMIN: CPT

## 2024-05-01 RX ORDER — RABIES VACC, HUMAN DIPLOID/PF 2.5 UNIT
1 VIAL (EA) INTRAMUSCULAR ONCE
Refills: 0 | Status: COMPLETED | OUTPATIENT
Start: 2024-05-01 | End: 2024-05-01

## 2024-05-01 RX ADMIN — Medication 1 MILLILITER(S): at 17:22

## 2024-05-01 NOTE — ED PROVIDER NOTE - MUSCULOSKELETAL, MLM
left 2nd finger abrasions/bites healing well, no erythema or discharge noted, no streaking, full ROM of left index finger, distal n/v intact, cap refill < 2 secs all fingers

## 2024-05-01 NOTE — ED PROVIDER NOTE - PATIENT PORTAL LINK FT
You can access the FollowMyHealth Patient Portal offered by [facility] by registering at the following website: [email or web address]. By joining [employer]’s FollowMyHealth portal, you will also be able to view your health information using other applications (apps) compatible with our system.

## 2024-05-01 NOTE — ED PROVIDER NOTE - CLINICAL SUMMARY MEDICAL DECISION MAKING FREE TEXT BOX
28-year-old male with history of anxiety presents for his third rabies vaccine today.  Patient had a dog bite from an unknown dog on 4/24, was seen at urgent care and given antibiotics and sent to ER for rabies prophylaxis.  Patient received rabies immunoglobulin and vaccine in the ER and is returning today for his third rabies vaccine. States that his tetanus is up-to-date.  Patient denies fevers, chills, wound discharge/redness, n/v or any other symptoms.    PE VSS afebrile NAD  No complications  Plan - Continue rabies vaccine schedule as discussed.

## 2024-05-01 NOTE — ED PROVIDER NOTE - OBJECTIVE STATEMENT
28-year-old male with history of anxiety presents for his third rabies vaccine today.  Patient had a dog bite from an unknown dog on 4/24, was seen at urgent care and given antibiotics and sent to ER for rabies prophylaxis.  Patient received rabies immunoglobulin and vaccine in the ER and is returning today for his third rabies vaccine. States that his tetanus is up-to-date.  Patient denies fevers, chills, wound discharge/redness, n/v or any other symptoms.

## 2024-05-01 NOTE — ED PROVIDER NOTE - NSFOLLOWUPINSTRUCTIONS_ED_ALL_ED_FT
Return to ED on 5/8 for your last rabies vaccine.  Finish course of antibiotics as prescribed earlier. If having worsening symptoms or other related symptoms, return to the ER immediately.

## 2024-05-08 ENCOUNTER — EMERGENCY (EMERGENCY)
Facility: HOSPITAL | Age: 29
LOS: 1 days | Discharge: HOME | End: 2024-05-08
Attending: EMERGENCY MEDICINE | Admitting: EMERGENCY MEDICINE
Payer: COMMERCIAL

## 2024-05-08 VITALS
OXYGEN SATURATION: 96 % | HEIGHT: 69 IN | TEMPERATURE: 98 F | WEIGHT: 270.07 LBS | DIASTOLIC BLOOD PRESSURE: 74 MMHG | SYSTOLIC BLOOD PRESSURE: 132 MMHG | HEART RATE: 92 BPM | RESPIRATION RATE: 20 BRPM

## 2024-05-08 VITALS
SYSTOLIC BLOOD PRESSURE: 110 MMHG | TEMPERATURE: 98 F | DIASTOLIC BLOOD PRESSURE: 73 MMHG | RESPIRATION RATE: 18 BRPM | OXYGEN SATURATION: 96 % | HEART RATE: 83 BPM

## 2024-05-08 PROCEDURE — 90675 RABIES VACCINE IM: CPT

## 2024-05-08 PROCEDURE — 90471 IMMUNIZATION ADMIN: CPT

## 2024-05-08 PROCEDURE — L9995: CPT

## 2024-05-08 PROCEDURE — 99281 EMR DPT VST MAYX REQ PHY/QHP: CPT | Mod: 25

## 2024-05-08 RX ORDER — ALPRAZOLAM 0.25 MG
1 TABLET ORAL
Refills: 0 | DISCHARGE

## 2024-05-08 RX ORDER — RABIES VACC, HUMAN DIPLOID/PF 2.5 UNIT
1 VIAL (EA) INTRAMUSCULAR ONCE
Refills: 0 | Status: COMPLETED | OUTPATIENT
Start: 2024-05-08 | End: 2024-05-08

## 2024-05-08 RX ORDER — ESCITALOPRAM OXALATE 10 MG/1
1 TABLET, FILM COATED ORAL
Qty: 0 | Refills: 0 | DISCHARGE

## 2024-05-08 RX ADMIN — Medication 1 MILLILITER(S): at 18:47

## 2024-05-08 NOTE — ED ADULT NURSE NOTE - TOBACCO USE
MD Notification    Notified Person: MD    Notified Person Name: Dr. Vincent    Notification Date/Time: 12/25/19 @ 1246    Notification Interaction: Text page    Purpose of Notification: Pt reporting increased SOB today. BP elevated 160-170's. Ativan X1 for increased anxiety. Pt/daughter requesting to see MD.    Orders Received:    Comments:     Never smoker

## 2024-05-08 NOTE — ED PROVIDER NOTE - CLINICAL SUMMARY MEDICAL DECISION MAKING FREE TEXT BOX
28-year-old male with past medical history of anxiety presents for his fourth rabies vaccine today.  Patient had a dog bite from an unknown dog on 424, was seen in urgent care and given antibiotics and sent to ER for rabies prophylaxis.  Patient received rabies immunoglobulin and vaccine in ER and returns today for his last vaccine.  Patient reports tetanus up-to-date.  Patient reports wound healing well.  Patient denies fever.    Physical exam vital signs stable afebrile no distress.  Left hand bite wound healing well.  No infection.  Impression rabies vaccination final dose.  Follow-up PCP as discussed.

## 2024-05-08 NOTE — ED PROVIDER NOTE - SKIN, MLM
Skin normal color for race, warm, dry  well healed bite left hand 2nd digit no erythema no discharge

## 2024-05-08 NOTE — ED PROVIDER NOTE - PATIENT PORTAL LINK FT
You can access the FollowMyHealth Patient Portal offered by [facility] by registering at the following website: [email or web address]. By joining [employer] [facility]’s FollowMyHealth portal, you will also be able to view your health information using other applications (apps) compatible with our system.

## 2024-05-08 NOTE — ED PROVIDER NOTE - NSFOLLOWUPINSTRUCTIONS_ED_ALL_ED_FT
Rabies Vaccine: What You Need to Know  Many vaccine information statements are available in Greek and other languages. See www.immunize.org/vis.    1. Why get vaccinated?  Rabies vaccine can prevent rabies.    Rabies is a serious illness that almost always results in death.    Rabies virus infects the central nervous system. Symptoms may occur from days to years after exposure to the virus and include delirium (confusion), abnormal behavior, hallucinations, hydrophobia (fear of water), and insomnia (difficulty sleeping), which precede coma and death.    People can get rabies if they have contact with the saliva or neural tissue of an infected animal, for example through a bite or scratch, and do not receive appropriate medical care, including rabies vaccine.    2. Rabies vaccine  Certain people with a higher risk for rabies exposures, such as those who work with potentially infected animals, are recommended to receive vaccine to help prevent rabies if an exposure happens. If you are at higher risk of exposure to the rabies virus:  You should receive 2 doses of rabies vaccine given on days 0 and 7.  Depending on your level of risk, you may be advised to have one or more blood tests or receive a booster dose within 3 years after the first 2 doses. Your health care provider can give you more details.  Rabies vaccine can prevent rabies if given to a person after an exposure. After an exposure or potential exposure to rabies, the wound site should be thoroughly cleaned with soap and water. If your health care provider or local health department recommend vaccination, the vaccine should be given as soon as possible after an exposure but may be effective any time before symptoms begin. Once symptoms begin, rabies vaccine is no longer helpful in preventing rabies.  If you have not been vaccinated against rabies in the past, you need 4 doses of rabies vaccine over 2 weeks (given on days 0, 3, 7, and 14). You should also get another medication called rabies immunoglobulin on the day you receive the first dose of rabies vaccine or soon afterwards.  If you have received rabies vaccination in the past, you typically need only 2 doses of rabies vaccine after an exposure.  Rabies vaccine may be given at the same time as other vaccines.    3. Talk with your health care provider  Tell your vaccination provider if the person getting the vaccine:  Has had an allergic reaction after a previous dose of rabies vaccine, or has any severe, life-threatening allergies  Has a weakened immune system  Is taking or plans to take chloroquine or a drug related to chloroquine  Has received rabies vaccine in the past (your provider will need to know when you received any rabies vaccine doses in the past)  In some cases, your health care provider may decide to postpone routine (pre-exposure) rabies vaccination until a future visit. Or your health care provider may perform a blood test before or after rabies vaccines are given to determine your level of immunity against rabies.    People with minor illnesses, such as a cold, may be vaccinated. People who are moderately or severely ill should usually wait until they recover before getting a routine (pre-exposure) dose of rabies vaccine. If you have been exposed to rabies virus, you should get vaccinated regardless of concurrent illnesses, pregnancy, breastfeeding, or weakened immune system.    Your health care provider can give you more information.    4. Risks of a vaccine reaction  Soreness, redness, swelling, or itching at the site of the injection, and headache, nausea, abdominal pain, muscle aches, or dizziness can happen after rabies vaccine.  Hives, pain in the joints, or fever sometimes happen after booster doses.  People sometimes faint after medical procedures, including vaccination. Tell your provider if you feel dizzy or have vision changes or ringing in the ears.    As with any medicine, there is a very remote chance of a vaccine causing a severe allergic reaction, other serious injury, or death.    5. What if there is a serious problem?  An allergic reaction could occur after the vaccinated person leaves the clinic. If you see signs of a severe allergic reaction (hives, swelling of the face and throat, difficulty breathing, a fast heartbeat, dizziness, or weakness), call 9-1-1 and get the person to the nearest hospital.    For other signs that concern you, call your health care provider.    Adverse reactions should be reported to the Vaccine Adverse Event Reporting System (VAERS). Your health care provider will usually file this report, or you can do it yourself. Visit the VAERS website at www.vaers.hhs.gov or call 1-735.446.5427. VAERS is only for reporting reactions, and VAERS staff members do not give medical advice.    6. How can I learn more?  Ask your health care provider.  Call your local or state health department.  Visit the website of the Food and Drug Administration (FDA) for vaccine package inserts and additional information at www.fda.gov/ vaccines-blood-biologics/vaccines.  Contact the Centers for Disease Control and Prevention (CDC):  Call 1-623.110.2857 (3-565-OQV-INFO) or  Visit CDC's rabies website at www.cdc.gov/rabies  Source: CDC Vaccine Information Statement Rabies Vaccine (06/02/2022)    This same material is available at www.cdc.gov for no charge.    This information is not intended to replace advice given to you by your health care provider. Make sure you discuss any questions you have with your health care provider.    Document Revised: 01/03/2024 Document Reviewed: 01/03/2024  [employer] Patient Education © 2024 [employer]  [employer] logo  Terms and Conditions  Privacy Policy  Editorial Policy  All content on this site: Copyright © 2024 [employer], its licensors, and contributors. All rights are reserved, including those for text and data mining, AI training, and similar technologies. For all open access content, the Creative Commons licensing terms apply.  Cookies are used by this site. To decline or learn more, visit our Cookies page.

## 2024-05-08 NOTE — ED PROVIDER NOTE - CARE PROVIDER_API CALL
Román Nicholson  Internal Medicine  [address] [facility], [address]  [place] [address]  Phone: [phone]  Fax: [phone]  Follow Up Time: 4-6 Days

## 2024-05-08 NOTE — ED PROVIDER NOTE - OBJECTIVE STATEMENT
Patient is a 28-year-old male with past medical history of anxiety presents for his fourth rabies vaccine today.  Patient had a dog bite from an unknown dog on 424, was seen in urgent care and given antibiotics and sent to ER for rabies prophylaxis.  Patient received rabies immunoglobulin and vaccine in ER and returns today for his last vaccine.  Patient reports tetanus up-to-date.  Patient reports wound healing well.  Patient denies fever.

## 2024-05-08 NOTE — ED ADULT TRIAGE NOTE - ACCOMPANIED BY
Self
17 yo w/ acute onset abdominal pain and vomiting x1 that has resolved since presentation.  Will get US pelvis, u preg and u dip than reassess.

## 2024-05-08 NOTE — ED ADULT NURSE NOTE - AS SC BRADEN MOISTURE
[Premenarchal] : premenarchal [FreeTextEntry2] : Pt is a 7 yr 7 month old female the past medical history significant for autism who presents today as a by the pediatrician due to concern for precocious puberty.  According to the mom she had not noted this herself but at the recommendation of the pediatrician she is bringing Christine it in.  She denies any notable breast development or vaginal discharge or pubic hair growth.  A bone age x-ray was done by the pediatrician which was read to be consistent with a chronological age.  Also an LH level was obtained which is prepubertal as well as a testosterone level which was undetectable.  Thyroid function tests are normal with normal.  Patient has had more recent weight gain as noted by the mother. (4) rarely moist

## 2024-05-08 NOTE — ED ADULT TRIAGE NOTE - CHIEF COMPLAINT QUOTE
Post-Care Instructions: I reviewed with the patient in detail post-care instructions. Patient is to wear sunprotection, and avoid picking at any of the treated lesions. Pt may apply Vaseline to crusted or scabbing areas. Duration Of Freeze Thaw-Cycle (Seconds): 0 Consent: The patient's consent was obtained including but not limited to risks of crusting, scabbing, blistering, scarring, darker or lighter pigmentary change, recurrence, incomplete removal and infection. Detail Level: Detailed rabies vaccine

## 2024-06-16 ENCOUNTER — EMERGENCY (EMERGENCY)
Facility: HOSPITAL | Age: 29
LOS: 1 days | Discharge: HOME | End: 2024-06-16
Attending: EMERGENCY MEDICINE
Payer: COMMERCIAL

## 2024-06-16 VITALS
OXYGEN SATURATION: 96 % | SYSTOLIC BLOOD PRESSURE: 131 MMHG | TEMPERATURE: 99 F | RESPIRATION RATE: 15 BRPM | DIASTOLIC BLOOD PRESSURE: 91 MMHG | HEART RATE: 69 BPM

## 2024-06-16 VITALS
HEIGHT: 69 IN | OXYGEN SATURATION: 97 % | WEIGHT: 274.92 LBS | DIASTOLIC BLOOD PRESSURE: 75 MMHG | SYSTOLIC BLOOD PRESSURE: 118 MMHG | HEART RATE: 76 BPM | RESPIRATION RATE: 16 BRPM | TEMPERATURE: 99 F

## 2024-06-16 LAB
ALBUMIN SERPL ELPH-MCNC: 4.3 G/DL — SIGNIFICANT CHANGE UP (ref 3.3–5)
ALP SERPL-CCNC: 44 U/L — SIGNIFICANT CHANGE UP (ref 40–120)
ALT FLD-CCNC: 34 U/L — SIGNIFICANT CHANGE UP (ref 10–45)
ANION GAP SERPL CALC-SCNC: 14 MMOL/L — SIGNIFICANT CHANGE UP (ref 5–17)
APPEARANCE UR: CLEAR — SIGNIFICANT CHANGE UP
APTT BLD: 28.4 SEC — SIGNIFICANT CHANGE UP (ref 24.5–35.6)
AST SERPL-CCNC: 24 U/L — SIGNIFICANT CHANGE UP (ref 10–40)
BACTERIA # UR AUTO: NEGATIVE /HPF — SIGNIFICANT CHANGE UP
BASOPHILS # BLD AUTO: 0.02 K/UL — SIGNIFICANT CHANGE UP (ref 0–0.2)
BASOPHILS NFR BLD AUTO: 0.3 % — SIGNIFICANT CHANGE UP (ref 0–2)
BILIRUB SERPL-MCNC: 0.2 MG/DL — SIGNIFICANT CHANGE UP (ref 0.2–1.2)
BILIRUB UR-MCNC: NEGATIVE — SIGNIFICANT CHANGE UP
BLD GP AB SCN SERPL QL: NEGATIVE — SIGNIFICANT CHANGE UP
BUN SERPL-MCNC: 18 MG/DL — SIGNIFICANT CHANGE UP (ref 7–23)
CALCIUM SERPL-MCNC: 9.6 MG/DL — SIGNIFICANT CHANGE UP (ref 8.4–10.5)
CAST: 0 /LPF — SIGNIFICANT CHANGE UP (ref 0–4)
CHLORIDE SERPL-SCNC: 106 MMOL/L — SIGNIFICANT CHANGE UP (ref 96–108)
CO2 SERPL-SCNC: 19 MMOL/L — LOW (ref 22–31)
COLOR SPEC: YELLOW — SIGNIFICANT CHANGE UP
CREAT SERPL-MCNC: 0.6 MG/DL — SIGNIFICANT CHANGE UP (ref 0.5–1.3)
DIFF PNL FLD: NEGATIVE — SIGNIFICANT CHANGE UP
EGFR: 135 ML/MIN/1.73M2 — SIGNIFICANT CHANGE UP
EOSINOPHIL # BLD AUTO: 0.07 K/UL — SIGNIFICANT CHANGE UP (ref 0–0.5)
EOSINOPHIL NFR BLD AUTO: 1 % — SIGNIFICANT CHANGE UP (ref 0–6)
GLUCOSE SERPL-MCNC: 100 MG/DL — HIGH (ref 70–99)
GLUCOSE UR QL: NEGATIVE MG/DL — SIGNIFICANT CHANGE UP
HCT VFR BLD CALC: 35.6 % — LOW (ref 39–50)
HGB BLD-MCNC: 12.4 G/DL — LOW (ref 13–17)
IMM GRANULOCYTES NFR BLD AUTO: 0.6 % — SIGNIFICANT CHANGE UP (ref 0–0.9)
INR BLD: 1.07 RATIO — SIGNIFICANT CHANGE UP (ref 0.85–1.18)
KETONES UR-MCNC: NEGATIVE MG/DL — SIGNIFICANT CHANGE UP
LEUKOCYTE ESTERASE UR-ACNC: NEGATIVE — SIGNIFICANT CHANGE UP
LYMPHOCYTES # BLD AUTO: 2.3 K/UL — SIGNIFICANT CHANGE UP (ref 1–3.3)
LYMPHOCYTES # BLD AUTO: 33.4 % — SIGNIFICANT CHANGE UP (ref 13–44)
MAGNESIUM SERPL-MCNC: 2.2 MG/DL — SIGNIFICANT CHANGE UP (ref 1.6–2.6)
MCHC RBC-ENTMCNC: 33.5 PG — SIGNIFICANT CHANGE UP (ref 27–34)
MCHC RBC-ENTMCNC: 34.8 GM/DL — SIGNIFICANT CHANGE UP (ref 32–36)
MCV RBC AUTO: 96.2 FL — SIGNIFICANT CHANGE UP (ref 80–100)
MONOCYTES # BLD AUTO: 0.65 K/UL — SIGNIFICANT CHANGE UP (ref 0–0.9)
MONOCYTES NFR BLD AUTO: 9.4 % — SIGNIFICANT CHANGE UP (ref 2–14)
NEUTROPHILS # BLD AUTO: 3.8 K/UL — SIGNIFICANT CHANGE UP (ref 1.8–7.4)
NEUTROPHILS NFR BLD AUTO: 55.3 % — SIGNIFICANT CHANGE UP (ref 43–77)
NITRITE UR-MCNC: NEGATIVE — SIGNIFICANT CHANGE UP
NRBC # BLD: 0 /100 WBCS — SIGNIFICANT CHANGE UP (ref 0–0)
PH UR: 5.5 — SIGNIFICANT CHANGE UP (ref 5–8)
PHOSPHATE SERPL-MCNC: 3.4 MG/DL — SIGNIFICANT CHANGE UP (ref 2.5–4.5)
PLATELET # BLD AUTO: 241 K/UL — SIGNIFICANT CHANGE UP (ref 150–400)
POTASSIUM SERPL-MCNC: 4.2 MMOL/L — SIGNIFICANT CHANGE UP (ref 3.5–5.3)
POTASSIUM SERPL-SCNC: 4.2 MMOL/L — SIGNIFICANT CHANGE UP (ref 3.5–5.3)
PROT SERPL-MCNC: 7.4 G/DL — SIGNIFICANT CHANGE UP (ref 6–8.3)
PROT UR-MCNC: NEGATIVE MG/DL — SIGNIFICANT CHANGE UP
PROTHROM AB SERPL-ACNC: 11.2 SEC — SIGNIFICANT CHANGE UP (ref 9.5–13)
RBC # BLD: 3.7 M/UL — LOW (ref 4.2–5.8)
RBC # FLD: 14.6 % — HIGH (ref 10.3–14.5)
RBC CASTS # UR COMP ASSIST: 0 /HPF — SIGNIFICANT CHANGE UP (ref 0–4)
REVIEW: SIGNIFICANT CHANGE UP
RH IG SCN BLD-IMP: POSITIVE — SIGNIFICANT CHANGE UP
SODIUM SERPL-SCNC: 139 MMOL/L — SIGNIFICANT CHANGE UP (ref 135–145)
SP GR SPEC: 1.02 — SIGNIFICANT CHANGE UP (ref 1–1.03)
SQUAMOUS # UR AUTO: 0 /HPF — SIGNIFICANT CHANGE UP (ref 0–5)
UROBILINOGEN FLD QL: 1 MG/DL — SIGNIFICANT CHANGE UP (ref 0.2–1)
WBC # BLD: 6.88 K/UL — SIGNIFICANT CHANGE UP (ref 3.8–10.5)
WBC # FLD AUTO: 6.88 K/UL — SIGNIFICANT CHANGE UP (ref 3.8–10.5)
WBC UR QL: 0 /HPF — SIGNIFICANT CHANGE UP (ref 0–5)

## 2024-06-16 PROCEDURE — 76498 UNLISTED MR PROCEDURE: CPT | Mod: MC

## 2024-06-16 PROCEDURE — 85730 THROMBOPLASTIN TIME PARTIAL: CPT

## 2024-06-16 PROCEDURE — 87086 URINE CULTURE/COLONY COUNT: CPT

## 2024-06-16 PROCEDURE — 36000 PLACE NEEDLE IN VEIN: CPT

## 2024-06-16 PROCEDURE — 85025 COMPLETE CBC W/AUTO DIFF WBC: CPT

## 2024-06-16 PROCEDURE — 85610 PROTHROMBIN TIME: CPT

## 2024-06-16 PROCEDURE — 99284 EMERGENCY DEPT VISIT MOD MDM: CPT | Mod: 25

## 2024-06-16 PROCEDURE — 36415 COLL VENOUS BLD VENIPUNCTURE: CPT

## 2024-06-16 PROCEDURE — 81001 URINALYSIS AUTO W/SCOPE: CPT

## 2024-06-16 PROCEDURE — 72148 MRI LUMBAR SPINE W/O DYE: CPT | Mod: 26

## 2024-06-16 PROCEDURE — 84100 ASSAY OF PHOSPHORUS: CPT

## 2024-06-16 PROCEDURE — 51702 INSERT TEMP BLADDER CATH: CPT

## 2024-06-16 PROCEDURE — 83735 ASSAY OF MAGNESIUM: CPT

## 2024-06-16 PROCEDURE — 86850 RBC ANTIBODY SCREEN: CPT

## 2024-06-16 PROCEDURE — 80053 COMPREHEN METABOLIC PANEL: CPT

## 2024-06-16 PROCEDURE — 86900 BLOOD TYPING SEROLOGIC ABO: CPT

## 2024-06-16 PROCEDURE — 86901 BLOOD TYPING SEROLOGIC RH(D): CPT

## 2024-06-16 PROCEDURE — 72146 MRI CHEST SPINE W/O DYE: CPT | Mod: 26

## 2024-06-16 PROCEDURE — 99285 EMERGENCY DEPT VISIT HI MDM: CPT

## 2024-06-16 PROCEDURE — 72141 MRI NECK SPINE W/O DYE: CPT | Mod: 26

## 2024-06-16 NOTE — ED ADULT TRIAGE NOTE - CHIEF COMPLAINT QUOTE
lower back pain and spasm for weeks, b/l thumb numbness and numbness from navel down b/l LE started 2 weeks ago, has seen neuro o/p and was told he has sciatica. states he is having trouble urinating since yesterday

## 2024-06-16 NOTE — ED PROVIDER NOTE - PHYSICAL EXAMINATION
Attn - alert, NAD, no pallor or jaundice, PERRL 3 mm, moist mm, skin - warm and dry, Lungs - clear, no w/r/r, good BS bilaterally, Cor - rr, no M, no rub, Abdo - ND, soft, NT, no HSM, no CVAT, no guarding or rebound. Extremities - no edema, no calf tenderness, distal pulses intact and symmetrical, Neuro - Cranial nerves intact.  Motor strength is 5/5 equal and symmetrical with difficulty toe walking.  Tandem gait is disturbed with poor balance.  Patient reports paresthesia in both thumbs as well as from the upper abdomen and back distally.  Proprioception is intact.  DTRs are +3/4 and symmetrical.  There is no clonus or foot drop.  Straight leg raise is negative.  Patient has no tenderness to the spine on percussion.

## 2024-06-16 NOTE — CONSULT NOTE ADULT - SUBJECTIVE AND OBJECTIVE BOX
p (1480)     HPI:    28M Hx unkn dog bite on Lt pointer finger April 24, now s/p full vaccine series finished 2wks ago, also got anti-Rabies Ig. Also has Anxiety/depression, on xanax 0.5 mg BID. Presents today c/o numb/tingling in entire body below nipple line, worst in b/l thumbs for 6wks. States he’s had these symptoms before 5yrs ago and it resolved after a vit B12 shot and several rds of acupuncture. Also feels weak in BLE w/ poor balance. MR C/T/L spine with very mild C6/7 disc w/ L>R foraminal stenosis, no cord impingement and no signal change. O/w wnl throughout T and L spine. Exam: states numb below nipples but inconsistent on repeated objective sensory exam, very mild issue with tandem gait, flat affect. Hyperreflexive in b/l patellar but not as strong with distracting measures. o/w intact AOx4, CNs intact.       =====================  PAST MEDICAL HISTORY   Anxiety      PAST SURGICAL HISTORY   No significant past surgical history      No Known Allergies      MEDICATIONS:  Antibiotics:    Neuro:    Other:      SOCIAL HISTORY:   Occupation:   Marital Status:     FAMILY HISTORY:      ROS: Negative except per HPI    LABS:  PT/INR - ( 16 Jun 2024 14:05 )   PT: 11.2 sec;   INR: 1.07 ratio         PTT - ( 16 Jun 2024 14:05 )  PTT:28.4 sec                        12.4   6.88  )-----------( 241      ( 16 Jun 2024 14:05 )             35.6     06-16    139  |  106  |  18  ----------------------------<  100<H>  4.2   |  19<L>  |  0.60    Ca    9.6      16 Jun 2024 14:05  Phos  3.4     06-16  Mg     2.2     06-16    TPro  7.4  /  Alb  4.3  /  TBili  0.2  /  DBili  x   /  AST  24  /  ALT  34  /  AlkPhos  44  06-16

## 2024-06-16 NOTE — ED PROVIDER NOTE - DIFFERENTIAL DIAGNOSIS
Patient with numbness below the nipple line and urinary retention with differential not limited to transverse myelitis versus cord compression versus spinal mass Differential Diagnosis

## 2024-06-16 NOTE — CONSULT NOTE ADULT - ASSESSMENT
28M Hx unkn dog bite on Lt pointer finger April 24, now s/p full vaccine series finished 2wks ago, also got anti-Rabies Ig. Also has Anxiety/depression, on xanax 0.5 mg BID. Presents today c/o numb/tingling in entire body below nipple line, worst in b/l thumbs for 6wks. States he’s had these symptoms before 5yrs ago and it resolved after a vit B12 shot and several rds of acupuncture. Also feels weak in BLE w/ poor balance. MR C/T/L spine with very mild C6/7 disc w/ L>R foraminal stenosis, no cord impingement and no signal change. O/w wnl throughout T and L spine. Exam: states numb below nipples but inconsistent on repeated objective sensory exam, very mild issue with tandem gait, flat affect. Hyperreflexive in b/l patellar but not as strong with distracting measures. o/w intact AOx4, CNs intact.     -No acute nsgy intervention    -differential would be anxiety/functional/conversion vs vaccine side effect vs extremely unlikely Rabies onset given pt states Sx started several days prior to animal bite.    -neuro c/s to w/u vit B deficiency vs anxiety/functional/conversion    -outpt f/u with Dr. Elvis walker

## 2024-06-16 NOTE — ED PROVIDER NOTE - PROVIDER TOKENS
PROVIDER:[TOKEN:[[id number]],FOLLOWUP:[Urgent]],PROVIDER:[TOKEN:[[id number]],FOLLOWUP:[Urgent]]

## 2024-06-16 NOTE — ED PROVIDER NOTE - NSFOLLOWUPINSTRUCTIONS_ED_ALL_ED_FT
You were seen in the ED for your numbness, we performed an MRI which showed no evidence of cord compression.     Please follow up with your Orthopedic physician.    Please follow up with Neurology. Please call to schedule your follow up appointment.       Please follow up with Urology to have your follow removed and for further management regarding your urinary retention.     [facility]   [address] [place] [address] [address], [place] [address]  [phone]    Please seek immediate medical attention or return to the ED if you have any new or worsening symptoms.

## 2024-06-16 NOTE — ED PROVIDER NOTE - PROGRESS NOTE DETAILS
Patient signed out to me by the prior attending.  The patient's disposition was discussed with the treating team and agreed upon.  Flaco Ramos M.D. (attending) patient with changing history and exam from attending, resident, RN, consultants. Patient had sensory from nipples down deficit with attending, umbilicus with resident, thumb numbness not consistent with dermatomal pattern of cord injury, questionable MS or myelitis pathology, patient with intact gait, normal heel to shin, functional tandem gait on neurosurgery exam, sensory exam migratory on neurology evaluation as well. Will await final recommendations and patient will be offered appropriate next steps as per final recommendations by neurosurgery and neurology, Escalation to admission/observation was considered. However, patient most likely better served with outpatient primary medical doctor and/or specialist and given strict return precautions and expectant management. Rafa Castellano PGY3: pt seen by NSx, no evidence of cord compression. pt seen by neurology, no c/f organic disease, to f/u with them in the outpatient setting. [place] placed for retention to f/u in outpatient with Urology. Return precautions discussed, verbal understanding demonstrated, all questions answered. Rafa Castellano PGY3: pt seen by NSx, no evidence of cord compression. pt seen by neurology, no c/f organic disease, to f/u with them in the outpatient setting. [place] placed for retention to f/u in outpatient with Urology. Return precautions discussed, verbal understanding demonstrated, all questions answered.  Agree with above, Flaco Ramos MD, FACEP  The patient was serially evaluated throughout emergency department course by the team. There was no acute deterioration up to this time in the emergency department. The patient has demonstrated clinical improvement and/or stability, feels better at this time according to emergency department team. Agree with goals/plan of emergency department care as described in this physician's electronic medical record, including diagnostics, therapeutics and consultation recommendation as clinically warranted. Will discharge home with close outpatient follow up with primary care physician/provider and specialist if necessary. The patient and/or family was educated on expectant management and return precautions concerning signs and features to return to the emergency department, in layman terms, including but not limited to: nausea, vomiting, fever, chills, the inability to eat, take medications, or drink, persistent/worsening symptoms or any concerns at all. There are no acute or immediate life threatening issues present on history, clinical exam, or any diagnostic evaluation. The patient is a safe disposition home, has capacity and insight into their condition, is ambulatory in the Emergency Department with no further questions and will follow up with their doctor(s) this week. Diagnosis, prognosis, natural history and treatment was discussed with patient and/or family. The patient and/or family were given the opportunity to ask questions and have them answered in full. The patient and/or family are with capacity and insight into the situation, treatment, risks, benefits, alternative therapies, and understand that they can ask any further questions if needed. Patient and/or family/guardian understands anticipatory guidance and was given strict return and follow up precautions. The patient and/or family/guardian has been informed of the necessity to follow up with the PMD/Clinic/follow up as provided within 2-3 days, and the patient and/or family/guardian reports understanding of above with capacity and insight. The patient and/or family/guardian were informed of any results of their tests and are were encouraged to follow up on the findings with their doctor as well as the need to inform their doctor of any results. All available results endorsed including unexpected incidental findings (copy of reports provided to patient). The patient and/or family/guardian are aware of the need to follow up with repeat testing as applicable and report understanding of the above with capacity and insight. The patient and/or family/guardain was made aware of any pending test results at the time of discharge and of the need to call back for the final results as well as the need to inform their doctor of the results. Patient signed out to me by the prior attending.  The patient's disposition was discussed with the treating team and agreed upon.  Flaco Ramos M.D. (attending) patient with variable history and exam from attending, resident, RN, consultants. Patient had reported sensory from nipples down deficit with attending, umbilicus with resident, thumb numbness not consistent with dermatomal pattern of cord injury, questionable MS or myelitis pathology, patient with intact gait, normal heel to shin, functional tandem gait on neurosurgery exam, sensory exam migratory on neurology evaluation as well. Will await final recommendations and patient will be offered appropriate next steps as per final recommendations by neurosurgery and neurology, Escalation to admission/observation was considered. However, patient most likely better served with outpatient primary medical doctor and/or specialist and given strict return precautions and expectant management.

## 2024-06-16 NOTE — ED ADULT NURSE NOTE - HIV OFFER
Spoke with patient, her sister was diagnosed at age 41, she does not have any other first degree relatives that have had cancer.   Previously Declined (within the last year)

## 2024-06-16 NOTE — ED ADULT NURSE REASSESSMENT NOTE - NS ED NURSE REASSESS COMMENT FT1
patient villaseñor bag changed to leg bag, instructed on use with both verbal and teach back understanding.

## 2024-06-16 NOTE — CONSULT NOTE ADULT - ASSESSMENT
Impression:  Numbness and paresthesias (initially reported below umbilicus) however inconsistent history provided and inconsistent sensory exam which does not localize to a central or peripheral neurologic distribution   On exam noted to have 5/5 strength throughout and normal reflexes. Patient seen ambulating normally without assistance. MRI cord compression series without evidence of stenosis. Presentation is not consistent with an inflammatory or demyelinating process, or GBS.   Unclear etiology of symptoms but appears functional in nature- may benefit from continued outpatient evaluation     Recommendations:  [] No further inpatient workup at this time  [] If patient able to ambulate, can be discharged home with close outpatient Neurology follow up. Patient advised to return to ED if symptoms worsen   [] Follow outpatient with Neurology in Neuromuscular clinic- can follow with Dr. Quintana or Dr. Diamond at [address] [place] [address]. [address]. [place] [address].  Patient can call [phone] to schedule this appointment.  Patient can also see Dr Mike Monge if unable to make an appointment at [address] [place] [address]. Please instruct the patient/family to call [phone] to schedule an appointment within the next 1-2 weeks. Office is located at [address] [facility], [place] [address]  [] Outpatient PT/OT    Case discussed with Neurology attending Dr. Corcoran, note finalized upon attending attestation.  PIERRE NARVAEZ is a 28y (1995) man with PMH of depression and anxiety who presents for evaluation of numbness, paresthesias, and urinary retention for which Neurology is consulted.     Impression:  Numbness and paresthesias (initially reported below umbilicus) however inconsistent history provided and inconsistent sensory exam which does not localize to a central or peripheral neurologic distribution   On exam noted to have 5/5 strength throughout and normal reflexes. Patient seen ambulating normally without assistance. MRI cord compression series without evidence of stenosis. Presentation is not consistent with an inflammatory or demyelinating process, or GBS.   Unclear etiology of symptoms but appears functional in nature- may benefit from continued outpatient evaluation     Recommendations:  [] No further inpatient workup at this time  [] If patient able to ambulate, can be discharged home with close outpatient Neurology follow up. Patient advised to return to ED if symptoms worsen   [] Follow outpatient with Neurology in Neuromuscular clinic- can follow with Dr. Quintana or Dr. Diamond at [address] [place] [address]. [address]. [place] [address].  Patient can call [phone] to schedule this appointment.  Patient can also see Dr Mike Monge if unable to make an appointment at [address] [place] [address]. Please instruct the patient/family to call [phone] to schedule an appointment within the next 1-2 weeks. Office is located at [address] [facility], [place] [address]  [] Outpatient PT/OT    Case discussed with Neurology attending Dr. Corcoran, note finalized upon attending attestation.

## 2024-06-16 NOTE — ED ADULT NURSE REASSESSMENT NOTE - NS ED NURSE REASSESS COMMENT FT1
Bladder scanned to r/o urinary retention. 577cc of urine scanned. Indwelling villaseñor 16f coude placed as per MD Wan orders. Approximately 850cc of clear yellow urine drained. UA/UC sent.

## 2024-06-16 NOTE — ED PROVIDER NOTE - CARE PROVIDER_API CALL
Mike Monge)  Neurology  [address] [facility], [address]  [place] [address]  Phone: [phone]  Fax: [phone]  Follow Up Time: Urgent    Luz Diamond  Neurology  [address] [facility], [address]  [place] [address]  Phone: [phone]  Fax: [phone]  Follow Up Time: Urgent

## 2024-06-16 NOTE — ED ADULT NURSE NOTE - NSFALLRISKINTERV_ED_ALL_ED
Assistance OOB with selected safe patient handling equipment if applicable/Assistance with ambulation/Communicate fall risk and risk factors to all staff, patient, and family/Monitor gait and stability/Provide visual cue: yellow wristband, yellow gown, etc/Reinforce activity limits and safety measures with patient and family/Call bell, personal items and telephone in reach/Instruct patient to call for assistance before getting out of bed/chair/stretcher/Non-slip footwear applied when patient is off stretcher/[place] to call system/Physically safe environment - no spills, clutter or unnecessary equipment/Purposeful Proactive Rounding/Room/bathroom lighting operational, light cord in reach

## 2024-06-16 NOTE — ED PROVIDER NOTE - OBJECTIVE STATEMENT
Attending note.  Patient was seen in room #25.  Patient reports numbness in the lower abdomen and both legs for approximately 1 month with intermittent paresthesia of both thumbs for the last month but unable to void since yesterday.  He reports pain in his back initially but no pain currently.  He denies any headache, change in vision, change in speech or loss of bowel control.  He denies any acute injury to his back.  He reports difficulty walking with poor balance and weakness in both legs.  He denies any recent illness or rashes.  He has a past medical history of depression and anxiety.  He takes Zoloft and Xanax.  He has no allergies to medication.  He denies any fevers. Attending note.  Patient was seen in room #25.  Patient reports numbness in the lower abdomen and both legs for approximately 1 month with intermittent paresthesia of both thumbs for the last month but unable to void since yesterday.  He reports pain in his back initially but no pain currently.  He denies any headache, change in vision, change in speech or loss of bowel control.  He denies any acute injury to his back.  He reports difficulty walking with poor balance and weakness in both legs.  He denies any recent illness or rashes.  He has a past medical history of depression and anxiety.  He takes Zoloft and Xanax.  He has no allergies to medication.  He denies any fevers.  Pt does report Rabies vaccination and Ig for dog bite to left hand 2 weeks ago.  He denies any difficulty swallowing.

## 2024-06-16 NOTE — ED PROVIDER NOTE - PATIENT PORTAL LINK FT
You can access the FollowMyHealth Patient Portal offered by [facility] by registering at the following website: [email or web address]. By joining [facility]’s FollowMyHealth portal, you will also be able to view your health information using other applications (apps) compatible with our system.

## 2024-06-16 NOTE — ED PROVIDER NOTE - CROS ED NEURO POS
Weakness in both legs and paresthesia from mid abdomen distally in both thumbs/DIFFICULTY WALKING / IMBALANCE

## 2024-06-16 NOTE — CONSULT NOTE ADULT - SUBJECTIVE AND OBJECTIVE BOX
Neurology - Consult Note    -  Spectra: [phone] ([facility]), [phone] ([facility])  -    HPI: Patient PIERRE NARVAEZ is a 28y (1995) wo/man with a PMHx significant for ***      Review of Systems:  INCOMPLETE   CONSTITUTIONAL: No fevers or chills  EYES AND ENT: No visual changes or no throat pain   NECK: No pain or stiffness  RESPIRATORY: No hemoptysis or shortness of breath  CARDIOVASCULAR: No chest pain or palpitations  GASTROINTESTINAL: No melena or hematochezia  GENITOURINARY: No dysuria or hematuria  NEUROLOGICAL: +As stated in HPI above  SKIN: No itching, burning, rashes, or lesions   All other review of systems is negative unless indicated above.    Allergies:  No Known Allergies      PMHx/PSHx/Family Hx: As above, otherwise see below   Anxiety        Social Hx:  No current use of tobacco, alcohol, or illicit drugs  Lives with ***    Medications:  MEDICATIONS  (STANDING):    MEDICATIONS  (PRN):      Vitals:  T(C): 37.1 (06-16-24 @ 16:39), Max: 37.4 (06-16-24 @ 12:35)  HR: 66 (06-16-24 @ 16:39) (66 - 76)  BP: 118/75 (06-16-24 @ 16:39) (118/69 - 118/75)  RR: 16 (06-16-24 @ 16:39) (16 - 18)  SpO2: 96% (06-16-24 @ 16:39) (96% - 98%)    Physical Examination: INCOMPLETE  General - NAD  Cardiovascular - Peripheral pulses palpable, no edema  Eyes - Fundoscopy with flat, sharp optic discs and no hemorrhage or exudates; Fundoscopy not well visualized; Fundoscopy not performed due to safety precautions in the setting of the COVID-19 pandemic    Neurologic Exam:  Mental status - Awake, Alert, Oriented to person, place, and time. Speech fluent, repetition and naming intact. Follows simple and complex commands. Attention/concentration, recent and remote memory (including registration and recall), and fund of knowledge intact    Cranial nerves - PERRLA, VFF, EOMI, face sensation (V1-V3) intact b/l, facial strength intact without asymmetry b/l, hearing intact b/l, palate with symmetric elevation, trapezius OR sternocleidomastiod 5/5 strength b/l, tongue midline on protrusion with full lateral movement    Motor - Normal bulk and tone throughout. No pronator drift.  Strength testing            Deltoid      Biceps      Triceps     Wrist Extension    Wrist Flexion     Interossei         R            5                 5               5                     5                              5                        5                 5  L             5                 5               5                     5                              5                        5                 5              Hip Flexion    Hip Extension    Knee Flexion    Knee Extension    Dorsiflexion    Plantar Flexion  R              5                           5                       5                           5                            5                          5  L              5                           5                        5                           5                            5                          5    Sensation - Light touch/temperature OR pain/vibration intact throughout    DTR's -             Biceps      Triceps     Brachioradialis      Patellar    Ankle    Toes/plantar response  R             2+             2+                  2+                       2+            2+                 Down  L              2+             2+                 2+                        2+           2+                 Down    Coordination - Finger to Nose intact b/l. No tremors appreciated    Gait and station - Normal casual gait. Romberg (-)    Labs:                        12.4   6.88  )-----------( 241      ( 16 Jun 2024 14:05 )             35.6     06-16    139  |  106  |  18  ----------------------------<  100<H>  4.2   |  19<L>  |  0.60    Ca    9.6      16 Jun 2024 14:05  Phos  3.4     06-16  Mg     2.2     06-16    TPro  7.4  /  Alb  4.3  /  TBili  0.2  /  DBili  x   /  AST  24  /  ALT  34  /  AlkPhos  44  06-16    CAPILLARY BLOOD GLUCOSE        LIVER FUNCTIONS - ( 16 Jun 2024 14:05 )  Alb: 4.3 g/dL / Pro: 7.4 g/dL / ALK PHOS: 44 U/L / ALT: 34 U/L / AST: 24 U/L / GGT: x             PT/INR - ( 16 Jun 2024 14:05 )   PT: 11.2 sec;   INR: 1.07 ratio         PTT - ( 16 Jun 2024 14:05 )  PTT:28.4 sec            Radiology:    < from: MR Acute Spinal Cord Compression (06.16.24 @ 15:32) >  FINDINGS:    CERVICAL SPINE:  Mildly degraded by motion artifact.  Mild cervical dextroscoliosis with an apex near C5-C6. Overall there is   straightening of the normal cervical lordosis. Cervical alignment   otherwise maintained. Vertebral body heights are maintained. No acute   fracture. No aggressive osseous lesions.    Normal cord caliber and signal.    No significant abnormality of the visualized intracranialcontents.    C2-C3: No significant spinal canal or neuroforaminal stenosis. No   significant hypertrophic facet joint degeneration.    C3-C4: No significant spinal canal or neuroforaminal stenosis. No   significant hypertrophic facet joint degeneration.    C4-C5: No significant spinal canal or neuroforaminal stenosis. No   significant hypertrophic facet joint degeneration.    C5-C6: No significant spinal canal or neuroforaminal stenosis. No   significant hypertrophic facet joint degeneration.    C6-C7: No significant spinal canal or neuroforaminal stenosis. No   significant hypertrophic facet joint degeneration.    C7-T1: No significant spinal canal or neuroforaminal stenosis. No   significant hypertrophic facet joint degeneration.    Paraspinal Tissues: Unremarkable.      THORACIC SPINE:  Mildly degraded by motion artifact.  12 thoracic type vertebral bodies. Mild thoracic levoscoliosis with an   apex near T10-T11. Thoracic alignment otherwise maintained. Vertebral   body heights maintained. No acute fracture. No aggressive osseous lesions.    Normal cord calibar and signal.    No significant spondylosis.    No significant abnormality in the visualized soft tissues.      LUMBAR SPINE:  Mildly degraded by motion artifact  L5-S1 is at image 10 of series 33. There are 5 lumbar type vertebral   bodies.    Normal alignment. Visualized vertebral body heights are maintained. No   acute fractures. No aggressive osseous lesions.    Conus has a normal appearance and terminates at L1.    L1-L2: No significant spinal canal or neuroforaminal stenosis.    L2-L3: No significant spinal canal or neuroforaminal stenosis.    L3-L4: No significant spinal canal or neuroforaminal stenosis.    L4-L5: No significant spinal canal or neuroforaminal stenosis.    L5-S1: No significant spinal canal or neuroforaminal stenosis.    Paraspinal Soft Tissues/Retroperitoneum: Unremarkable.    ____________________  IMPRESSION:    1.  Normal cord caliber and signal.  2.  No significant spinal canal or neuroforaminal stenosis.    < end of copied text >     Neurology - Consult Note    -  Spectra: [id number] ([facility]), [phone] ([facility])  -    HPI: Patient PIERRE NARVAEZ is a 28y (1995) man with PMH of depression and anxiety who presents for evaluation of numbness, paresthesias, and urinary retention for which Neurology is consulted.     Patient is an inconsistent historian, reports numbness and tingling in the from umbilicus to below which is his chief complaint. Initially reported onset of symptoms 2 days ago, then reported onset 1 month ago with worsening over the past week. He also reports paresthesia of both thumbs. Told neurology team this is a more recent symptom, however told ED team this has been going on for the past month. He also reports 3-4 days of urinary retention and constipation. Bladder scan done in ED showed 200cc.     Patient reports injury to his neck a few years ago but denies recent trauma or falls. He endorses back pain which radiates and is shock-like to all 4 extremities. He reports difficulty walking with poor balance and weakness in both legs over the past month. MRI cord compression series was unrevealing.     He denies any headache, focal weakness, bowel or bladder incontinence.  He denies any recent illness or rashes. Reports being bit by a dog about 1 month ago and took the rabies vaccine. Denies any recent new medications; he takes Zoloft and Xanax.      No family history of any neurologic conditions.   Reports his step sister recently passed away from metastatic breast cancer at a young age, this past winter.       Review of Systems:    CONSTITUTIONAL: No fevers or chills  EYES AND ENT: No visual changes or no throat pain   NECK: No pain or stiffness  RESPIRATORY: No hemoptysis or shortness of breath  CARDIOVASCULAR: No chest pain or palpitations  GASTROINTESTINAL: No melena or hematochezia  GENITOURINARY: No dysuria or hematuria  NEUROLOGICAL: +As stated in HPI above  SKIN: No itching, burning, rashes, or lesions   All other review of systems is negative unless indicated above.    Allergies:  No Known Allergies      PMHx/PSHx/Family Hx: As above, otherwise see below   Anxiety      Social Hx:      Medications:  MEDICATIONS  (STANDING):    MEDICATIONS  (PRN):      Vitals:  T(C): 37.1 (06-16-24 @ 16:39), Max: 37.4 (06-16-24 @ 12:35)  HR: 66 (06-16-24 @ 16:39) (66 - 76)  BP: 118/75 (06-16-24 @ 16:39) (118/69 - 118/75)  RR: 16 (06-16-24 @ 16:39) (16 - 18)  SpO2: 96% (06-16-24 @ 16:39) (96% - 98%)    Physical Examination:   General - NAD  Eyes - Fundoscopy not performed due to safety precautions in the setting of the COVID-19 pandemic  Psych: Flat affect, anxious appearing     Neurologic Exam:  Mental status - Awake, Alert, Oriented to person, place, and time. Speech fluent, repetition and naming intact. Follows simple and complex commands. Attention/concentration, recent and remote memory (including registration and recall), and fund of knowledge intact    Cranial nerves - PERRLA, VFF, EOMI, face sensation (V1-V3) intact b/l, facial strength intact without asymmetry b/l, hearing intact b/l, palate with symmetric elevation, trapezius  5/5 strength b/l, tongue midline on protrusion with full lateral movement    Motor - Normal bulk and tone throughout. No pronator drift.  Strength testing            Deltoid      Biceps      Triceps     Wrist Extension    Wrist Flexion     Interossei         R            5                 5               5                     5                              5                        5                 5  L             5                 5               5                     5                              5                        5                 5              Hip Flexion    Hip Extension    Knee Flexion    Knee Extension    Dorsiflexion    Plantar Flexion  R              5                           5                       5                           5                            5                          5  L              5                           5                        5                           5                            5                          5    Sensation - Inconsistent exam when light touch assessed in thoracic area and lower extremities. Pinprick and temp intact throughout. Proprioception intact.    DTR's -             Biceps      Triceps     Brachioradialis      Patellar    Ankle    Toes/plantar response  R             2+             2+                  2+                       2+            2+                 Down  L              2+             2+                 2+                        2+           2+                 Down    Coordination - Finger to Nose intact b/l. No tremors appreciated    Gait and station - Normal casual gait. Romberg was negative     Labs:                        12.4   6.88  )-----------( 241      ( 16 Jun 2024 14:05 )             35.6     06-16    139  |  106  |  18  ----------------------------<  100<H>  4.2   |  19<L>  |  0.60    Ca    9.6      16 Jun 2024 14:05  Phos  3.4     06-16  Mg     2.2     06-16    TPro  7.4  /  Alb  4.3  /  TBili  0.2  /  DBili  x   /  AST  24  /  ALT  34  /  AlkPhos  44  06-16    CAPILLARY BLOOD GLUCOSE        LIVER FUNCTIONS - ( 16 Jun 2024 14:05 )  Alb: 4.3 g/dL / Pro: 7.4 g/dL / ALK PHOS: 44 U/L / ALT: 34 U/L / AST: 24 U/L / GGT: x             PT/INR - ( 16 Jun 2024 14:05 )   PT: 11.2 sec;   INR: 1.07 ratio         PTT - ( 16 Jun 2024 14:05 )  PTT:28.4 sec            Radiology:    < from: MR Acute Spinal Cord Compression (06.16.24 @ 15:32) >  FINDINGS:    CERVICAL SPINE:  Mildly degraded by motion artifact.  Mild cervical dextroscoliosis with an apex near C5-C6. Overall there is   straightening of the normal cervical lordosis. Cervical alignment   otherwise maintained. Vertebral body heights are maintained. No acute   fracture. No aggressive osseous lesions.    Normal cord caliber and signal.    No significant abnormality of the visualized intracranialcontents.    C2-C3: No significant spinal canal or neuroforaminal stenosis. No   significant hypertrophic facet joint degeneration.    C3-C4: No significant spinal canal or neuroforaminal stenosis. No   significant hypertrophic facet joint degeneration.    C4-C5: No significant spinal canal or neuroforaminal stenosis. No   significant hypertrophic facet joint degeneration.    C5-C6: No significant spinal canal or neuroforaminal stenosis. No   significant hypertrophic facet joint degeneration.    C6-C7: No significant spinal canal or neuroforaminal stenosis. No   significant hypertrophic facet joint degeneration.    C7-T1: No significant spinal canal or neuroforaminal stenosis. No   significant hypertrophic facet joint degeneration.    Paraspinal Tissues: Unremarkable.      THORACIC SPINE:  Mildly degraded by motion artifact.  12 thoracic type vertebral bodies. Mild thoracic levoscoliosis with an   apex near T10-T11. Thoracic alignment otherwise maintained. Vertebral   body heights maintained. No acute fracture. No aggressive osseous lesions.    Normal cord calibar and signal.    No significant spondylosis.    No significant abnormality in the visualized soft tissues.      LUMBAR SPINE:  Mildly degraded by motion artifact  L5-S1 is at image 10 of series 33. There are 5 lumbar type vertebral   bodies.    Normal alignment. Visualized vertebral body heights are maintained. No   acute fractures. No aggressive osseous lesions.    Conus has a normal appearance and terminates at L1.    L1-L2: No significant spinal canal or neuroforaminal stenosis.    L2-L3: No significant spinal canal or neuroforaminal stenosis.    L3-L4: No significant spinal canal or neuroforaminal stenosis.    L4-L5: No significant spinal canal or neuroforaminal stenosis.    L5-S1: No significant spinal canal or neuroforaminal stenosis.    Paraspinal Soft Tissues/Retroperitoneum: Unremarkable.    ____________________  IMPRESSION:    1.  Normal cord caliber and signal.  2.  No significant spinal canal or neuroforaminal stenosis.    < end of copied text >

## 2024-06-16 NOTE — ED ADULT NURSE NOTE - OBJECTIVE STATEMENT
Pt is 28y M with PMH anxiety on xanax, lexapro, sciatica, complaining of BLE numbness. Pt reports injury 5 years ago when kickboxing, kicking target when sudden onset of back pain and BLE numbness/tingling. Previously evaluated at outpatient neurologist, diagnosed with sciatica and treated symptoms with physical therapy, acupuncture. Reports onset of BLE and bilateral thumb numbness x 2 weeks, "skin feels like jello", symmetric numbness bilaterally. Reports onset of urinary incontinence 2 days ago, experiences urge to urinate but unable to fully empty bladder or control when to urinate. Upon assessment, A&Ox4, able to ambulate independently, endorses BLE numbness, pedal and radial pulses strong, cap refill < 2 seconds of all extremities, all vitals WNL.

## 2024-06-17 ENCOUNTER — APPOINTMENT (OUTPATIENT)
Dept: UROLOGY | Facility: CLINIC | Age: 29
End: 2024-06-17
Payer: COMMERCIAL

## 2024-06-17 VITALS
SYSTOLIC BLOOD PRESSURE: 127 MMHG | DIASTOLIC BLOOD PRESSURE: 88 MMHG | RESPIRATION RATE: 19 BRPM | TEMPERATURE: 98.5 F | OXYGEN SATURATION: 97 % | HEART RATE: 81 BPM

## 2024-06-17 DIAGNOSIS — R33.9 RETENTION OF URINE, UNSPECIFIED: ICD-10-CM

## 2024-06-17 LAB
CULTURE RESULTS: NO GROWTH — SIGNIFICANT CHANGE UP
SPECIMEN SOURCE: SIGNIFICANT CHANGE UP

## 2024-06-17 PROCEDURE — 99214 OFFICE O/P EST MOD 30 MIN: CPT

## 2024-06-17 NOTE — ASSESSMENT
[FreeTextEntry1] : Existing villaseñor catheter removed without complications. Recommend staying hydrated. Instructed pt to notify the office if he is unable to urinate in 6 -8 hr or proceed to the closest urgent Care or ER.

## 2024-06-17 NOTE — HISTORY OF PRESENT ILLNESS
[FreeTextEntry1] : 28-year-old man h/o sciatica  presents today s/p ER visit 06/16 for lower back pain with spasm for weeks, b/l thumb numbness and numbness from navel down to b/l LE which started 2 weeks ago. Pt also reports difficulty urinating, which a villaseñor catheter was placed in the ER and pt was discharged and advised to follow up with Urology. Here today for TOV of existing villaseñor catheter.   Presents with villaseñor attached to leg bag with dark concentrated urine.   Reports mild numbness b/l lower extremities.  Denies constipation or any urinary symptoms.

## 2024-06-17 NOTE — PHYSICAL EXAM
[General Appearance - In No Acute Distress] : no acute distress [Edema] : no peripheral edema [] : no respiratory distress [Abdomen Soft] : soft [Normal Station and Gait] : the gait and station were normal for the patient's age [Skin Color & Pigmentation] : normal skin color and pigmentation [Oriented To Time, Place, And Person] : oriented to person, place, and time [de-identified] : see HPI

## 2024-08-28 NOTE — ED ADULT NURSE NOTE - SKIN TEMPERATURE MOISTURE
"Spoke c pt. Upon review of her chart, pt was seen by Dr. Cruz 08/26/24. "Sports referral for OMT for trapezius pain ". Informed pt that Dr. Mortensen does not perform OMT & she will need to be seen by either Sylwia Espinoza DO or Tera Espinoza DO.. She has R shoulder MRI scheduled 09/05/24. R/s pt to see Dr. Delgado. Confirmed appt date, time, location. Pt will call c additional questions/concerns in interim. Pt expressed understanding & was thankful.    " warm

## 2024-10-15 NOTE — ED PROVIDER NOTE - RESPIRATORY, MLM
Problem: POSTPARTUM  Goal: Optimize infant feeding at the breast  Description: INTERVENTIONS:  - Initiate breast feeding within first hour after birth.   - Monitor effectiveness of current breast feeding efforts.  - Assess support systems available to mother/family.  - Identify cultural beliefs/practices regarding lactation, letdown techniques, maternal food preferences.  - Assess mother's knowledge and previous experience with breast feeding.  - Provide information as needed about early infant feeding cues (e.g., rooting, lip smacking, sucking fingers/hand) versus late cue of crying.  - Discuss/demonstrate breast feeding aids (e.g., infant sling, nursing footstool/pillows, and breast pumps).  - Encourage mother/other family members to express feelings/concerns, and actively listen.  - Educate father/SO about benefits of breast feeding and how to manage common lactation challenges.  - Recommend avoidance of specific medications or substances incompatible with breast feeding.  - Assess and monitor for signs of nipple pain/trauma.  - Instruct and provide assistance with proper latch.  - Review techniques for milk expression (breast pumping) and storage of breast milk. Provide pumping equipment/supplies, instructions and assistance, as needed.  - Encourage rooming-in and breast feeding on demand.  - Encourage skin-to-skin contact.  - Provide LC support as needed.  - Assess for and manage engorgement.  - Provide breast feeding education handouts and information on community breast feeding support.   Outcome: Progressing  Goal: Establishment of adequate milk supply with medication/procedure interruptions  Description: INTERVENTIONS:  - Review techniques for milk expression (breast pumping).   - Provide pumping equipment/supplies, instructions, and assistance until it is safe to breastfeed infant.  Outcome: Progressing  Goal: Appropriate maternal -  bonding  Description: INTERVENTIONS:  - Assess caregiver-  interactions.  - Assess caregiver's emotional status and coping mechanisms.  - Encourage caregiver to participate in  daily care.  - Assess support systems available to mother/family.  - Provide /case management support as needed.  Outcome: Progressing      Breath sounds clear and equal bilaterally.

## 2025-05-06 ENCOUNTER — EMERGENCY (EMERGENCY)
Facility: HOSPITAL | Age: 30
LOS: 1 days | End: 2025-05-06
Attending: EMERGENCY MEDICINE | Admitting: EMERGENCY MEDICINE
Payer: COMMERCIAL

## 2025-05-06 VITALS — SYSTOLIC BLOOD PRESSURE: 138 MMHG | DIASTOLIC BLOOD PRESSURE: 78 MMHG | HEART RATE: 87 BPM

## 2025-05-06 VITALS
OXYGEN SATURATION: 96 % | DIASTOLIC BLOOD PRESSURE: 84 MMHG | RESPIRATION RATE: 18 BRPM | WEIGHT: 279.99 LBS | HEART RATE: 90 BPM | SYSTOLIC BLOOD PRESSURE: 149 MMHG | HEIGHT: 69 IN | TEMPERATURE: 98 F

## 2025-05-06 PROCEDURE — 99284 EMERGENCY DEPT VISIT MOD MDM: CPT

## 2025-05-06 PROCEDURE — 72110 X-RAY EXAM L-2 SPINE 4/>VWS: CPT | Mod: 26

## 2025-05-06 PROCEDURE — 99283 EMERGENCY DEPT VISIT LOW MDM: CPT | Mod: 25

## 2025-05-06 PROCEDURE — 72110 X-RAY EXAM L-2 SPINE 4/>VWS: CPT

## 2025-05-06 RX ORDER — NAPROXEN SODIUM 275 MG
500 TABLET ORAL ONCE
Refills: 0 | Status: COMPLETED | OUTPATIENT
Start: 2025-05-06 | End: 2025-05-06

## 2025-05-06 RX ORDER — CYCLOBENZAPRINE HYDROCHLORIDE 15 MG/1
1 CAPSULE, EXTENDED RELEASE ORAL
Qty: 10 | Refills: 0
Start: 2025-05-06

## 2025-05-06 RX ORDER — NAPROXEN SODIUM 275 MG
1 TABLET ORAL
Qty: 10 | Refills: 0
Start: 2025-05-06

## 2025-05-06 RX ORDER — OXYCODONE HYDROCHLORIDE AND ACETAMINOPHEN 10; 325 MG/1; MG/1
1 TABLET ORAL
Qty: 10 | Refills: 0
Start: 2025-05-06

## 2025-05-06 RX ORDER — CYCLOBENZAPRINE HYDROCHLORIDE 15 MG/1
10 CAPSULE, EXTENDED RELEASE ORAL ONCE
Refills: 0 | Status: COMPLETED | OUTPATIENT
Start: 2025-05-06 | End: 2025-05-06

## 2025-05-06 RX ORDER — OXYCODONE HYDROCHLORIDE AND ACETAMINOPHEN 10; 325 MG/1; MG/1
1 TABLET ORAL ONCE
Refills: 0 | Status: DISCONTINUED | OUTPATIENT
Start: 2025-05-06 | End: 2025-05-06

## 2025-05-06 RX ADMIN — Medication 500 MILLIGRAM(S): at 09:37

## 2025-05-06 RX ADMIN — CYCLOBENZAPRINE HYDROCHLORIDE 10 MILLIGRAM(S): 15 CAPSULE, EXTENDED RELEASE ORAL at 09:38

## 2025-05-06 RX ADMIN — OXYCODONE HYDROCHLORIDE AND ACETAMINOPHEN 1 TABLET(S): 10; 325 TABLET ORAL at 11:08

## 2025-05-06 RX ADMIN — Medication 40 MILLIGRAM(S): at 09:37

## 2025-05-06 NOTE — ED PROVIDER NOTE - CLINICAL SUMMARY MEDICAL DECISION MAKING FREE TEXT BOX
Acute on chronic low back pain, neurologically intact.  Will check x-ray, pain control, outpatient orthopedic follow-up

## 2025-05-06 NOTE — ED PROVIDER NOTE - OBJECTIVE STATEMENT
29-year-old male with a history of depression, chronic known herniated disc in his back, prediabetes, on Wegovy for weight loss presents with has chronic back pain, which has been worse over the past 1 week.  Patient felt some tingling in his feet, which he has occasionally, but is not feeling that at this time.  No numbness or tingling at this time.  No focal weakness.  No radiation of pain down the arms or legs.  No fever or chills.  No change in bowel or bladder.  No weakness or dizziness.  No new definitive trauma.  No aggravating or alleviating factors otherwise noted.  No other acute complaints.  Patient went to a eastern medicine specialist who performed cupping on the patient yesterday.

## 2025-05-06 NOTE — ED PROVIDER NOTE - NSFOLLOWUPINSTRUCTIONS_ED_ALL_ED_FT
1.  Follow-up with your Primary Medical doctor, Dr Fred Menezes. Call today for prompt follow-up.  2.  With back pain, whether it is a new pain or a chronic pain, it is very important to have close, prompt outpatient follow-up for continued workup, evaluation and definitive diagnosis.  If you develop worsening or persistent pain, any numbness, tingling, weakness of one or both of your legs, any fever, or any changes / difficulty urinating then you will need to see your back doctor immediately or you can return to the emergency room.  Many times your doctor will need to set you up for further imaging / testing such as an MRI.  3.  See attached instruction sheets for additional information, including information regarding signs and symptoms to look out for, reasons to seek immediate care and other important instructions.  4.  Follow-up with orthopedics on Friday at 11:30 AM as scheduled  5.  Naproxen every 12 hours as needed for pain, with food  6.  Flexeril as needed for muscle spasm, no driving or operating machinery  7.  Percocet as needed for moderate pain, no driving operate machinery  8.  Protonix once daily for 14 days

## 2025-05-06 NOTE — ED PROVIDER NOTE - PROGRESS NOTE DETAILS
Patient doing well, no acute complaints. Patient with moderate improvement, pain is much better controlled. Discussed with patient about the nature of the back pain and the importance of outpatient follow-up for continued workup, evaluation and definitive diagnosis.  Discussed back pain and neurologic precautions including numbness, tingling, weakness, fever, and changes in bowel/bladder.  An opportunity to ask questions was given . Understanding of all instructions and precautions was verbalized and the patient will follow-up as outpatient as soon as possible. Patient also understands the possibility of needing additional imaging, ie MRI as outpatient. Patient will return with any changes, concerns, or any worsening / persistent symptoms.

## 2025-05-06 NOTE — ED ADULT NURSE NOTE - NSFALLUNIVINTERV_ED_ALL_ED
Bed/Stretcher in lowest position, wheels locked, appropriate side rails in place/Call bell, personal items and telephone in reach/Instruct patient to call for assistance before getting out of bed/chair/stretcher/Non-slip footwear applied when patient is off stretcher/[place] to call system/Physically safe environment - no spills, clutter or unnecessary equipment/Purposeful proactive rounding/Room/bathroom lighting operational, light cord in reach

## 2025-05-06 NOTE — ED PROVIDER NOTE - CARE PROVIDER_API CALL
Eligio Sanchez  Orthopaedic Surgery  [id number]  [place] [address]  Phone: [phone]  Fax: [phone]  Scheduled Appointment: 05/09/2025 11:30 AM

## 2025-05-06 NOTE — ED PROVIDER NOTE - PHYSICAL EXAMINATION
Multiple cupping marks on the patient's upper and mid back    Neuro:   AAOx3, Cranial nerves 2-12 intact, central and peripheral vision intact, extra-ocular movements intact.  Neck: normal. No signs of tenderness or edema. Speech: clear, Gait and Weight Bearing: normal, Deep Tendon Reflexes: normal, 2+ reflexes (PT, Knee), Sensation: present and normal in 4 extremities, Coordination: normal, Pronator Drift: none, Straight Leg Raise: normal bilaterally with equal strength bl, Motor: normal. Nl strength (5/5) equal bl x 4, Posturing: normal, Normal saddle sensation.    Positive tenderness to bilateral paraspinal lumbar.   no tenderness in the sciatic notch
unk

## 2025-05-06 NOTE — ED ADULT NURSE REASSESSMENT NOTE - NS ED NURSE REASSESS COMMENT FT1
Pt sitting up in stretcher still admits too 8/10 back pain, provider noted medication to be ordered. Hernán GARCIA

## 2025-05-06 NOTE — ED ADULT TRIAGE NOTE - CHIEF COMPLAINT QUOTE
A&Ox4 ambulatory to triage with complaints of lower back pain since last week. did high intensity workout last week. hx herniated discs. no loss of bladder/bowel control

## 2025-05-06 NOTE — ED PROVIDER NOTE - PATIENT PORTAL LINK FT
You can access the FollowMyHealth Patient Portal offered by [facility] by registering at the following website: [email or web address]. By joining [facility]’s FollowMyHealth portal, you will also be able to view your health information using other applications (apps) compatible with our system.

## 2025-05-06 NOTE — ED ADULT NURSE NOTE - OBJECTIVE STATEMENT
29 yr old male arrives to ED c/o lower back pain. 29 yr old male arrives to ED c/o lower back pain. Pt notes to have hx of x3 herniated disk, Pt notes have salvatore to gym and aggravated back, pt notes no numbness or tingling going down legs, pt notes to have just started Ozempic, pt denies fever chills, chest pain or sob at this time.  Hernán GARCIA

## 2025-06-30 ENCOUNTER — EMERGENCY (EMERGENCY)
Facility: HOSPITAL | Age: 30
LOS: 1 days | End: 2025-06-30
Attending: STUDENT IN AN ORGANIZED HEALTH CARE EDUCATION/TRAINING PROGRAM | Admitting: STUDENT IN AN ORGANIZED HEALTH CARE EDUCATION/TRAINING PROGRAM
Payer: COMMERCIAL

## 2025-06-30 VITALS
HEART RATE: 84 BPM | WEIGHT: 279.99 LBS | HEIGHT: 69 IN | SYSTOLIC BLOOD PRESSURE: 132 MMHG | TEMPERATURE: 98 F | RESPIRATION RATE: 18 BRPM | OXYGEN SATURATION: 96 % | DIASTOLIC BLOOD PRESSURE: 94 MMHG

## 2025-06-30 VITALS
DIASTOLIC BLOOD PRESSURE: 79 MMHG | SYSTOLIC BLOOD PRESSURE: 118 MMHG | RESPIRATION RATE: 16 BRPM | TEMPERATURE: 98 F | OXYGEN SATURATION: 95 % | HEART RATE: 82 BPM

## 2025-06-30 PROCEDURE — 96374 THER/PROPH/DIAG INJ IV PUSH: CPT

## 2025-06-30 PROCEDURE — 99053 MED SERV 10PM-8AM 24 HR FAC: CPT

## 2025-06-30 PROCEDURE — 99284 EMERGENCY DEPT VISIT MOD MDM: CPT

## 2025-06-30 PROCEDURE — 99284 EMERGENCY DEPT VISIT MOD MDM: CPT | Mod: 25

## 2025-06-30 RX ORDER — ONDANSETRON HCL/PF 4 MG/2 ML
4 VIAL (ML) INJECTION ONCE
Refills: 0 | Status: COMPLETED | OUTPATIENT
Start: 2025-06-30 | End: 2025-06-30

## 2025-06-30 RX ORDER — ONDANSETRON HCL/PF 4 MG/2 ML
1 VIAL (ML) INJECTION
Qty: 12 | Refills: 0
Start: 2025-06-30 | End: 2025-07-03

## 2025-06-30 RX ADMIN — Medication 1000 MILLILITER(S): at 05:50

## 2025-06-30 RX ADMIN — Medication 4 MILLIGRAM(S): at 05:50

## 2025-06-30 NOTE — ED PROVIDER NOTE - NSFOLLOWUPINSTRUCTIONS_ED_ALL_ED_FT
Take Ondansetron up to three times a day as needed for nausea/vomiting    Avoid hot, spicy, acidic foods to prevent worsening of symptoms. Drink fluids to stay hydrated    Follow up with your primary care doctor in 2-3 days for further evaluation of your symptoms    Return to the Emergency Department if you have any new or worsening symptoms

## 2025-06-30 NOTE — ED PROVIDER NOTE - CLINICAL SUMMARY MEDICAL DECISION MAKING FREE TEXT BOX
29M p/w vomiting that has been going on since being diagnosed with flu A on Wed. Started having URI symptoms on Tues. Denies abd pain, current fevers    Gen: NAD, non-toxic appearing, awake alert   HEENT: normal conjunctiva, oral mucosa moist  Lung: CTAB, no respiratory distress, no wheezes/rhonchi/rales B/L, speaking in full sentences  CV: RRR  Abd: soft, NT, ND, no guarding, no rigidity, no rebound tenderness  MSK: no visible deformities, ROM normal in UE/LE  Neuro: No focal sensory or motor deficits  Skin: Warm, well perfused    DDx includes but not limited to: low concern for significant dehydration, yas, lyte derangements  Plan: symptomatic control with zofran, IVF, reassess symptoms    See Progress Notes for further updates on ED Course

## 2025-06-30 NOTE — ED ADULT TRIAGE NOTE - NS ED TRIAGE AVPU SCALE
Social History       Tobacco History       Smoking Status  Former Smoker Smoking Tobacco Type  Cigarettes      Smokeless Tobacco Use  Never Used      Tobacco Comment  1 a day              Alcohol History       Alcohol Use Status  Yes Drinks/Week  0 Standard drinks or equivalent per week Amount  0.0 standard drinks of alcohol/wk Comment  occasionally, couple times a year              Drug Use       Drug Use Status  No              Sexual Activity       Sexually Active  Not Asked                    ALLERGIES     Allergies   Allergen Reactions    Codeine Hives and Nausea And Vomiting       CURRENT MEDICATIONS      Current Outpatient Medications   Medication Sig Dispense Refill    Voclosporin 7.9 MG CAPS Take 15.8 mg by mouth in the morning and at bedtime 120 capsule 0    HYDROcodone-acetaminophen (NORCO) 5-325 MG per tablet Take 1 tablet by mouth every 8 hours as needed for Pain for up to 30 days. 90 tablet 0    predniSONE (DELTASONE) 10 MG tablet Take 1 tablet by mouth 2 times daily 60 tablet 0    mycophenolate (CELLCEPT) 500 MG tablet Take 2 tablets by mouth every morning (before breakfast) AND 2 tablets every evening.  120 tablet 1    MAPAP 500 MG CAPS capsule take 2 capsules by mouth every 6 hours      vitamin D (ERGOCALCIFEROL) 1.25 MG (12224 UT) CAPS capsule Take 1 capsule by mouth once a week 12 capsule 3    hydroxychloroquine (PLAQUENIL) 200 MG tablet Take 1 tablet by mouth 2 times daily 60 tablet 5    loratadine (CLARITIN) 10 MG tablet take 1 tablet by mouth once daily 90 tablet 4    gabapentin (NEURONTIN) 400 MG capsule take 1 capsule by mouth TID a day 270 capsule 1    omeprazole (PRILOSEC) 20 MG delayed release capsule take 1 capsule by mouth once daily 30 capsule 3    ondansetron (ZOFRAN) 4 MG tablet take 1 tablet by mouth three times a day if needed for nausea and vomiting 30 tablet 0    dapagliflozin (FARXIGA) 5 MG tablet Take 1 tablet by mouth every morning 90 tablet 1    levothyroxine (SYNTHROID)
Alert-The patient is alert, awake and responds to voice. The patient is oriented to time, place, and person. The triage nurse is able to obtain subjective information.

## 2025-06-30 NOTE — ED PROVIDER NOTE - PATIENT PORTAL LINK FT
You can access the FollowMyHealth Patient Portal offered by [facility] by registering at the following website: [email or web address]. By joining [facility]’s FollowMyHealth portal, you will also be able to view your health information using other applications (apps) compatible with our system.

## 2025-06-30 NOTE — ED ADULT NURSE NOTE - OBJECTIVE STATEMENT
Patient reports he tested flu positive on Wednesday and has been feeling nauseous and vomiting at times, denies chest pain or sob

## 2025-06-30 NOTE — ED PROVIDER NOTE - PROGRESS NOTE DETAILS
pt reassessed: states he feels much better w/ meds. explained plan to pt; will dc to home with pcp f/u and return precautions. all quests answered